# Patient Record
Sex: FEMALE | Race: BLACK OR AFRICAN AMERICAN
[De-identification: names, ages, dates, MRNs, and addresses within clinical notes are randomized per-mention and may not be internally consistent; named-entity substitution may affect disease eponyms.]

---

## 2019-06-22 ENCOUNTER — HOSPITAL ENCOUNTER (EMERGENCY)
Dept: HOSPITAL 35 - ER | Age: 44
Discharge: HOME | End: 2019-06-22
Payer: COMMERCIAL

## 2019-06-22 VITALS — WEIGHT: 255.01 LBS | HEIGHT: 62 IN | BODY MASS INDEX: 46.93 KG/M2

## 2019-06-22 VITALS — SYSTOLIC BLOOD PRESSURE: 148 MMHG | DIASTOLIC BLOOD PRESSURE: 87 MMHG

## 2019-06-22 DIAGNOSIS — E78.00: ICD-10-CM

## 2019-06-22 DIAGNOSIS — E11.9: ICD-10-CM

## 2019-06-22 DIAGNOSIS — L03.221: Primary | ICD-10-CM

## 2019-06-22 LAB
%HYPO/RBC NFR BLD AUTO: (no result) %
ANION GAP SERPL CALC-SCNC: 11 MMOL/L (ref 7–16)
ANISOCYTOSIS BLD QL SMEAR: (no result)
BASOPHILS NFR BLD AUTO: 0.5 % (ref 0–2)
BUN SERPL-MCNC: 5 MG/DL (ref 7–18)
CALCIUM SERPL-MCNC: 9.2 MG/DL (ref 8.5–10.1)
CHLORIDE SERPL-SCNC: 98 MMOL/L (ref 98–107)
CO2 SERPL-SCNC: 26 MMOL/L (ref 21–32)
CREAT SERPL-MCNC: 0.8 MG/DL (ref 0.6–1)
EOSINOPHIL NFR BLD: 0.4 % (ref 0–3)
ERYTHROCYTE [DISTWIDTH] IN BLOOD BY AUTOMATED COUNT: 18.2 % (ref 10.5–14.5)
GLUCOSE SERPL-MCNC: 277 MG/DL (ref 74–106)
GRANULOCYTES NFR BLD MANUAL: 85.4 % (ref 36–66)
HCT VFR BLD CALC: 38.1 % (ref 37–47)
HGB BLD-MCNC: 11.7 GM/DL (ref 12–15)
LYMPHOCYTES NFR BLD AUTO: 8.6 % (ref 24–44)
MCH RBC QN AUTO: 22.6 PG (ref 26–34)
MCHC RBC AUTO-ENTMCNC: 30.8 G/DL (ref 28–37)
MCV RBC: 73.4 FL (ref 80–100)
MICROCYTES: (no result)
MONOCYTES NFR BLD: 5.1 % (ref 1–8)
NEUTROPHILS # BLD: 14.3 THOU/UL (ref 1.4–8.2)
PLATELET # BLD EST: NORMAL 10*3/UL
PLATELET # BLD: 344 THOU/UL (ref 150–400)
POTASSIUM SERPL-SCNC: 3.9 MMOL/L (ref 3.5–5.1)
RBC # BLD AUTO: 5.2 MIL/UL (ref 4.2–5)
SODIUM SERPL-SCNC: 135 MMOL/L (ref 136–145)
WBC # BLD AUTO: 16.7 THOU/UL (ref 4–11)

## 2019-06-26 ENCOUNTER — HOSPITAL ENCOUNTER (INPATIENT)
Dept: HOSPITAL 35 - ER | Age: 44
LOS: 12 days | Discharge: HOME HEALTH | DRG: 853 | End: 2019-07-08
Payer: COMMERCIAL

## 2019-06-26 VITALS — DIASTOLIC BLOOD PRESSURE: 83 MMHG | SYSTOLIC BLOOD PRESSURE: 138 MMHG

## 2019-06-26 VITALS — DIASTOLIC BLOOD PRESSURE: 73 MMHG | SYSTOLIC BLOOD PRESSURE: 127 MMHG

## 2019-06-26 VITALS — SYSTOLIC BLOOD PRESSURE: 153 MMHG | DIASTOLIC BLOOD PRESSURE: 82 MMHG

## 2019-06-26 VITALS — SYSTOLIC BLOOD PRESSURE: 135 MMHG | DIASTOLIC BLOOD PRESSURE: 85 MMHG

## 2019-06-26 VITALS — DIASTOLIC BLOOD PRESSURE: 107 MMHG | SYSTOLIC BLOOD PRESSURE: 185 MMHG

## 2019-06-26 VITALS — SYSTOLIC BLOOD PRESSURE: 144 MMHG | DIASTOLIC BLOOD PRESSURE: 78 MMHG

## 2019-06-26 VITALS — HEIGHT: 62.01 IN | WEIGHT: 256 LBS | BODY MASS INDEX: 46.51 KG/M2

## 2019-06-26 VITALS — DIASTOLIC BLOOD PRESSURE: 80 MMHG | SYSTOLIC BLOOD PRESSURE: 126 MMHG

## 2019-06-26 VITALS — SYSTOLIC BLOOD PRESSURE: 145 MMHG | DIASTOLIC BLOOD PRESSURE: 90 MMHG

## 2019-06-26 VITALS — SYSTOLIC BLOOD PRESSURE: 169 MMHG | DIASTOLIC BLOOD PRESSURE: 97 MMHG

## 2019-06-26 VITALS — DIASTOLIC BLOOD PRESSURE: 71 MMHG | SYSTOLIC BLOOD PRESSURE: 133 MMHG

## 2019-06-26 VITALS — SYSTOLIC BLOOD PRESSURE: 148 MMHG | DIASTOLIC BLOOD PRESSURE: 84 MMHG

## 2019-06-26 VITALS — SYSTOLIC BLOOD PRESSURE: 127 MMHG | DIASTOLIC BLOOD PRESSURE: 88 MMHG

## 2019-06-26 DIAGNOSIS — M72.9: ICD-10-CM

## 2019-06-26 DIAGNOSIS — E78.00: ICD-10-CM

## 2019-06-26 DIAGNOSIS — J85.2: ICD-10-CM

## 2019-06-26 DIAGNOSIS — L02.11: ICD-10-CM

## 2019-06-26 DIAGNOSIS — L03.221: ICD-10-CM

## 2019-06-26 DIAGNOSIS — E11.65: ICD-10-CM

## 2019-06-26 DIAGNOSIS — K05.6: ICD-10-CM

## 2019-06-26 DIAGNOSIS — Z91.14: ICD-10-CM

## 2019-06-26 DIAGNOSIS — E78.5: ICD-10-CM

## 2019-06-26 DIAGNOSIS — R91.1: ICD-10-CM

## 2019-06-26 DIAGNOSIS — E66.01: ICD-10-CM

## 2019-06-26 DIAGNOSIS — A41.81: Primary | ICD-10-CM

## 2019-06-26 DIAGNOSIS — Z79.84: ICD-10-CM

## 2019-06-26 DIAGNOSIS — L02.419: ICD-10-CM

## 2019-06-26 DIAGNOSIS — Z79.899: ICD-10-CM

## 2019-06-26 LAB
%HYPO/RBC NFR BLD AUTO: (no result) %
ANION GAP SERPL CALC-SCNC: 13 MMOL/L (ref 7–16)
ANISOCYTOSIS BLD QL SMEAR: (no result)
BASO STIPL BLD QL SMEAR: (no result)
BE(VIVO): -3.2 MMOL/L
BUN SERPL-MCNC: 5 MG/DL (ref 7–18)
CALCIUM SERPL-MCNC: 10.1 MG/DL (ref 8.5–10.1)
CHLORIDE SERPL-SCNC: 94 MMOL/L (ref 98–107)
CHOLEST SERPL-MCNC: 193 MG/DL (ref ?–200)
CO2 SERPL-SCNC: 26 MMOL/L (ref 21–32)
CREAT SERPL-MCNC: 0.8 MG/DL (ref 0.6–1)
ERYTHROCYTE [DISTWIDTH] IN BLOOD BY AUTOMATED COUNT: 18.1 % (ref 10.5–14.5)
EST. AVERAGE GLUCOSE BLD GHB EST-MCNC: 289 MG/DL
GLUCOSE SERPL-MCNC: 331 MG/DL (ref 74–106)
GLYCOHEMOGLOBIN (HGB A1C): 11.7 % (ref 4.8–5.6)
GRANULOCYTES NFR BLD MANUAL: 65 % (ref 36–66)
HCO3 BLD-SCNC: 22.9 MMOL/L (ref 22–26)
HCT VFR BLD CALC: 37.4 % (ref 37–47)
HDLC SERPL-MCNC: 22 MG/DL (ref 40–?)
HGB BLD-MCNC: 11.6 GM/DL (ref 12–15)
LDLC SERPL-MCNC: 145 MG/DL (ref ?–100)
LG PLATELETS BLD QL SMEAR: (no result)
LYMPHOCYTES NFR BLD AUTO: 18 % (ref 24–44)
MCH RBC QN AUTO: 22.5 PG (ref 26–34)
MCHC RBC AUTO-ENTMCNC: 30.9 G/DL (ref 28–37)
MCV RBC: 73 FL (ref 80–100)
MICROCYTES: (no result)
MONOCYTES NFR BLD: 9 % (ref 1–8)
NEUTROPHILS # BLD: 16.6 THOU/UL (ref 1.4–8.2)
NEUTS BAND NFR BLD: 8 % (ref 0–8)
PCO2 BLD: 45.4 MMHG (ref 35–45)
PLATELET # BLD EST: NORMAL 10*3/UL
PLATELET # BLD: 368 THOU/UL (ref 150–400)
PO2 BLD: 95.5 MMHG (ref 80–100)
POIKILOCYTOSIS BLD QL SMEAR: (no result)
POLYCHROMASIA BLD QL SMEAR: (no result)
POTASSIUM SERPL-SCNC: 4 MMOL/L (ref 3.5–5.1)
RBC # BLD AUTO: 5.13 MIL/UL (ref 4.2–5)
SODIUM SERPL-SCNC: 133 MMOL/L (ref 136–145)
TC:HDL: 8.8 RATIO
TRIGL SERPL-MCNC: 139 MG/DL (ref ?–150)
VLDLC SERPL CALC-MCNC: 28 MG/DL (ref ?–40)
WBC # BLD AUTO: 22.7 THOU/UL (ref 4–11)

## 2019-06-26 PROCEDURE — 10879: CPT

## 2019-06-26 PROCEDURE — 50010 RENAL EXPLORATION: CPT

## 2019-06-26 PROCEDURE — 10047: CPT

## 2019-06-26 PROCEDURE — 27000 TENOTOMY ADDUCTOR HIP PERQ: CPT

## 2019-06-26 PROCEDURE — 62110: CPT

## 2019-06-26 PROCEDURE — 62900: CPT

## 2019-06-26 PROCEDURE — 70005: CPT

## 2019-06-26 PROCEDURE — 50386 REMOVE STENT VIA TRANSURETH: CPT

## 2019-06-26 PROCEDURE — 50417: CPT

## 2019-06-26 PROCEDURE — 50398: CPT

## 2019-06-26 PROCEDURE — 50101: CPT

## 2019-06-26 NOTE — NUR
ASSUMED PATIENT CARE AT 0700. A/O X4. NECK HAS EDMEA WITH OPEN ULCER AND
DRAINAGE. VSS WITH TEMP. SATRT 02 3L/NC AT THIS TIME. PATIENT AMBULATED IN
ROOM. NPO UNTILL NOW. PATIENT WENT TO OR TO HAVE NECK ABCESS I &D. WILL KEEP
MONITOR.

## 2019-06-26 NOTE — NUR
PATIENT WAS A NEW ADMISSION TO THE UNIT THIS SHIFT. SHE ARRIVED VIA CART FROM
THE ER AND WAS ABLE TO AMBULATE TO BED WITHOUIT INCIDENT. PATIENT IS FULLY
ORIENTED AND ABLE TO PARTICIPATE IN ADMISSION AND CALL APPROPRIATELY FOR
NEEDS. NURSE TO COMPLETE ADMISSION PROCESS AND INITIATE CARE PLAN.

## 2019-06-26 NOTE — NUR
WOUND CARE CONSULT;
THIS PATIENT HAS THE S/S CONSISTANT WITH AN ABCESS OF THE POSERIOR NECK.
INDURATION NOTED TO THE NECK AND UPPER BACK DRAINING PURULENT BROWNISH RED
THICK DRAINAGE.  THE PATIENT IS IN PAIN RECIEVING MORPHINE IV AT THIS TIME.
SHE IS VERY UNCOMFORTABLE. A BOARDERED FOAM DRESSING WAS IN PLACE AND WAS
OVERTAKEN BY THE DRAINAGE.
 
RECOMMENDATION; AQUACEL AG TO WOUND BED, COVER WITH A BOARDERED FOAM DRESSING,
CHANGE DAILY/PRN
 
DISCUSSED WITH RN

## 2019-06-27 VITALS — SYSTOLIC BLOOD PRESSURE: 118 MMHG | DIASTOLIC BLOOD PRESSURE: 65 MMHG

## 2019-06-27 VITALS — DIASTOLIC BLOOD PRESSURE: 78 MMHG | SYSTOLIC BLOOD PRESSURE: 138 MMHG

## 2019-06-27 VITALS — DIASTOLIC BLOOD PRESSURE: 77 MMHG | SYSTOLIC BLOOD PRESSURE: 128 MMHG

## 2019-06-27 VITALS — SYSTOLIC BLOOD PRESSURE: 127 MMHG | DIASTOLIC BLOOD PRESSURE: 68 MMHG

## 2019-06-27 VITALS — SYSTOLIC BLOOD PRESSURE: 130 MMHG | DIASTOLIC BLOOD PRESSURE: 81 MMHG

## 2019-06-27 VITALS — SYSTOLIC BLOOD PRESSURE: 145 MMHG | DIASTOLIC BLOOD PRESSURE: 90 MMHG

## 2019-06-27 VITALS — DIASTOLIC BLOOD PRESSURE: 88 MMHG | SYSTOLIC BLOOD PRESSURE: 150 MMHG

## 2019-06-27 VITALS — SYSTOLIC BLOOD PRESSURE: 135 MMHG | DIASTOLIC BLOOD PRESSURE: 84 MMHG

## 2019-06-27 VITALS — SYSTOLIC BLOOD PRESSURE: 142 MMHG | DIASTOLIC BLOOD PRESSURE: 59 MMHG

## 2019-06-27 VITALS — SYSTOLIC BLOOD PRESSURE: 137 MMHG | DIASTOLIC BLOOD PRESSURE: 75 MMHG

## 2019-06-27 LAB
ANION GAP SERPL CALC-SCNC: 12 MMOL/L (ref 7–16)
BUN SERPL-MCNC: 6 MG/DL (ref 7–18)
CALCIUM SERPL-MCNC: 7.9 MG/DL (ref 8.5–10.1)
CHLORIDE SERPL-SCNC: 101 MMOL/L (ref 98–107)
CO2 SERPL-SCNC: 24 MMOL/L (ref 21–32)
CREAT SERPL-MCNC: 0.6 MG/DL (ref 0.6–1)
ERYTHROCYTE [DISTWIDTH] IN BLOOD BY AUTOMATED COUNT: 18.4 % (ref 10.5–14.5)
GLUCOSE SERPL-MCNC: 159 MG/DL (ref 74–106)
HCT VFR BLD CALC: 30.4 % (ref 37–47)
HGB BLD-MCNC: 9.3 GM/DL (ref 12–15)
MCH RBC QN AUTO: 22.7 PG (ref 26–34)
MCHC RBC AUTO-ENTMCNC: 30.6 G/DL (ref 28–37)
MCV RBC: 73.9 FL (ref 80–100)
PLATELET # BLD: 292 THOU/UL (ref 150–400)
POTASSIUM SERPL-SCNC: 4.3 MMOL/L (ref 3.5–5.1)
RBC # BLD AUTO: 4.11 MIL/UL (ref 4.2–5)
SODIUM SERPL-SCNC: 137 MMOL/L (ref 136–145)
WBC # BLD AUTO: 27 THOU/UL (ref 4–11)

## 2019-06-27 NOTE — NUR
PATIENT IS PROGRESSING SLOWLY IN HER CARE PLAN. VITAL SIGNS STABLE WITH
PATIENT HAVING NO COMPLAINTS OF NAUSEA. PATIENT COMPLAINED OF PAIN IN BACK OF
NECK FROM SURGICAL SITE AND WAS TREATED EFFECTIVELY WITH MEDICATION AND
REPOSITIONING. PATIENT BROUGHT UP FROM PACU AROUND 2100 FOLLOWING I&D OF NECK
WOUND. VITAL SIGNS TAKEN PER PROTOCOL. PATIENT EXHIBITED FEVER  AT
HIGHEST BUT HAS SINCE SHOWN REDUCTION WITH TYLENOL AND NON PHARMACOLOGICAL
INTERVENTION. BREATHING STABLE ON OXYGEN AS EVIDENCED BY READINGS ON
CONTINUOUS SATURATION MONITOR. SURGICAL SITE HAS SHOWN SOME DRAINAGE AND HAS
BEEN REINFORCED. PATIENT HAS BEEN UP TO THE BEDSIDE COMMODE WITH ASSISTANCE
INCIDENT FREE, BUT SHOULD STILL BE CONSIDERED A FALL RISK. FULLY ORIENTED AND
ABLE TO PARTICIPATE IN CARE. CONTINUE PLAN OF CARE.

## 2019-06-27 NOTE — NUR
PT RATES PAIN TO NECK SURGICAL SITE BETWEEN 7 AND 10 OUT OF 10..MEDICATED PER
EMAR...VERY FLAT AFFECT..DROWSY...

## 2019-06-28 VITALS — DIASTOLIC BLOOD PRESSURE: 92 MMHG | SYSTOLIC BLOOD PRESSURE: 140 MMHG

## 2019-06-28 VITALS — DIASTOLIC BLOOD PRESSURE: 68 MMHG | SYSTOLIC BLOOD PRESSURE: 117 MMHG

## 2019-06-28 VITALS — DIASTOLIC BLOOD PRESSURE: 82 MMHG | SYSTOLIC BLOOD PRESSURE: 125 MMHG

## 2019-06-28 VITALS — DIASTOLIC BLOOD PRESSURE: 89 MMHG | SYSTOLIC BLOOD PRESSURE: 132 MMHG

## 2019-06-28 VITALS — DIASTOLIC BLOOD PRESSURE: 102 MMHG | SYSTOLIC BLOOD PRESSURE: 155 MMHG

## 2019-06-28 LAB
ANION GAP SERPL CALC-SCNC: 9 MMOL/L (ref 7–16)
BUN SERPL-MCNC: 4 MG/DL (ref 7–18)
CALCIUM SERPL-MCNC: 8.6 MG/DL (ref 8.5–10.1)
CHLORIDE SERPL-SCNC: 101 MMOL/L (ref 98–107)
CO2 SERPL-SCNC: 29 MMOL/L (ref 21–32)
CREAT SERPL-MCNC: 0.8 MG/DL (ref 0.6–1)
ERYTHROCYTE [DISTWIDTH] IN BLOOD BY AUTOMATED COUNT: 18.5 % (ref 10.5–14.5)
GLUCOSE SERPL-MCNC: 193 MG/DL (ref 74–106)
HCT VFR BLD CALC: 28.9 % (ref 37–47)
HGB BLD-MCNC: 8.8 GM/DL (ref 12–15)
MCH RBC QN AUTO: 22.2 PG (ref 26–34)
MCHC RBC AUTO-ENTMCNC: 30.6 G/DL (ref 28–37)
MCV RBC: 72.7 FL (ref 80–100)
PLATELET # BLD: 318 THOU/UL (ref 150–400)
POTASSIUM SERPL-SCNC: 3.3 MMOL/L (ref 3.5–5.1)
RBC # BLD AUTO: 3.98 MIL/UL (ref 4.2–5)
SODIUM SERPL-SCNC: 139 MMOL/L (ref 136–145)
WBC # BLD AUTO: 14.8 THOU/UL (ref 4–11)

## 2019-06-28 NOTE — NUR
PT UP IN CHAIR, AWAKE. HAS HAD RESTLESS NIGHT. C/O NECK BEING UNCOMFORTABLE.
NECK REMAINS SWOLLEN, INFLAMED, PINK/RED AND WITH DRAINAGE. PT SR ON MONITOR.
CONTINUES ON 2L NC AND ON MAINTENANCE IVFs. AM LABS TO BE DRAWN AND REVIEWED.

## 2019-06-28 NOTE — NUR
INITIAL ASSESSMENT:
SW reviewed chart and spoke with nursing and attending physician. Pt was
admitted from home due to sepsis/neck abscess. Pt is s/p I&D of abscess. Pt
remains on IV abx. SW met with pt at bedside. Introduced role of SW. Pt is
alert/orientated x 4. Pt reports she lives at home with her . Prior to
admission, pt was independent with ADLs. No use of DME. Pt is currently on O2.
Pt states she does not have a PCP. Pt has a list of [facility] providers. Unsure of
discharge timeframe at this time. Plan is for pt to discharge home when
medically stable. SW is following to assist as needed with discharge planning.

## 2019-06-28 NOTE — NUR
AAOX4 PLESANT WITH FLAT AFFECT.  USES CALL LIGHT APPROPIRATELY.  MORPHNE FOR
PAIN.  SPOUSE AT BEDSIDE.  GOOD APPETITE.  DRESSING TO POSTERIOR NECK DRY AND
INTACT.  UP TO BR WITH SLOW STEADY GAIT.

## 2019-06-28 NOTE — NUR
POSTERIOR NECK DRESSING CHANGED - PURULENT DRAINAGE.  WOUND REPACKED WITH
AQUAGEL COVERED WITH KERLIX AND ABD.

## 2019-06-29 VITALS — DIASTOLIC BLOOD PRESSURE: 65 MMHG | SYSTOLIC BLOOD PRESSURE: 104 MMHG

## 2019-06-29 VITALS — DIASTOLIC BLOOD PRESSURE: 85 MMHG | SYSTOLIC BLOOD PRESSURE: 137 MMHG

## 2019-06-29 VITALS — DIASTOLIC BLOOD PRESSURE: 86 MMHG | SYSTOLIC BLOOD PRESSURE: 138 MMHG

## 2019-06-29 VITALS — DIASTOLIC BLOOD PRESSURE: 78 MMHG | SYSTOLIC BLOOD PRESSURE: 141 MMHG

## 2019-06-29 LAB
ANION GAP SERPL CALC-SCNC: 8 MMOL/L (ref 7–16)
BUN SERPL-MCNC: 5 MG/DL (ref 7–18)
CALCIUM SERPL-MCNC: 8.1 MG/DL (ref 8.5–10.1)
CHLORIDE SERPL-SCNC: 101 MMOL/L (ref 98–107)
CO2 SERPL-SCNC: 29 MMOL/L (ref 21–32)
CREAT SERPL-MCNC: 0.7 MG/DL (ref 0.6–1)
ERYTHROCYTE [DISTWIDTH] IN BLOOD BY AUTOMATED COUNT: 18.3 % (ref 10.5–14.5)
GLUCOSE SERPL-MCNC: 194 MG/DL (ref 74–106)
HCT VFR BLD CALC: 28.3 % (ref 37–47)
HGB BLD-MCNC: 8.7 GM/DL (ref 12–15)
MCH RBC QN AUTO: 22.6 PG (ref 26–34)
MCHC RBC AUTO-ENTMCNC: 30.9 G/DL (ref 28–37)
MCV RBC: 73.1 FL (ref 80–100)
PLATELET # BLD: 312 THOU/UL (ref 150–400)
POTASSIUM SERPL-SCNC: 3.3 MMOL/L (ref 3.5–5.1)
RBC # BLD AUTO: 3.87 MIL/UL (ref 4.2–5)
SODIUM SERPL-SCNC: 138 MMOL/L (ref 136–145)
WBC # BLD AUTO: 9.7 THOU/UL (ref 4–11)

## 2019-06-29 NOTE — NUR
AAOX4 VERY PLEASANT AND COOPERATVE.  MSO4 AND HYDROCODONE FOR PAIN IN
POSTERIOR NECK WOUND.  DRESSING CHANGED X2 AND PACKED PURULENT WOUND WITH
AQUAGEL COVERED WITH KERLIX AND ABD.  RIGHT UPPER ARM IV SALINE LOCKED.  FAIR
APPETITE TOLERATING FOOD AND FLUIDS WITHOUT C/O N/V.  FAMILY AT BEDSIDED.
SHOWER TAKEN THIS AM WITH GOOD TOLERATION OF ACTIVITY. ENCOURAGE PATIENT TO
AMBULATE.

## 2019-06-29 NOTE — NUR
ASSUMED CARE OF PT AT 1900. A&Ox4, COOPERATIVE. VS STABLE, SR ON TELE. C/O
PAIN IN POSTERIOR NECK, WITH ERYTHEMA AND OPEN WOUND. PAIN MEDS GIVEN X 2 WITH
PARTIAL RELIEF AND DRESSING CHANGED W/ SMALL AMOUNT OF SEROUSANGIOUS DRAINAGE.
IV FLUIDS AND ANTIBX'S INFUSING AS ORDERED. WALKED HALLWAYS W/ SBA. NO ACUTE
DISTRESS. DAUGHTER AT BEDSIDE. PROGRESSING TOWARDS POC GOALS.

## 2019-06-30 VITALS — SYSTOLIC BLOOD PRESSURE: 149 MMHG | DIASTOLIC BLOOD PRESSURE: 95 MMHG

## 2019-06-30 VITALS — SYSTOLIC BLOOD PRESSURE: 147 MMHG | DIASTOLIC BLOOD PRESSURE: 80 MMHG

## 2019-06-30 VITALS — DIASTOLIC BLOOD PRESSURE: 97 MMHG | SYSTOLIC BLOOD PRESSURE: 135 MMHG

## 2019-06-30 VITALS — SYSTOLIC BLOOD PRESSURE: 145 MMHG | DIASTOLIC BLOOD PRESSURE: 84 MMHG

## 2019-06-30 NOTE — NUR
ASSUMED CARE AT START OF SHIFT PT C/O PAIN AT NECK WOUND SITE, DRESSING
CHANGED AT 2300 LARGE AMOUNT OF PUS LIKE DRAINAGE NOTED, PAIN MEDICATION GIVEN
AS PRESCRIBED , TOLERATED DRESSING . DISCUSSED PLAN OF CARE AND AGREEABLE ,
CARDIAC MONIOTR SHOWS NSR , WILL CONINTUE WITH POC AND EPORT CHANGES OR
ABNORMAL FINDINGS.

## 2019-06-30 NOTE — NUR
ASSUMED CARE OF PT AT APPROX 0700. PT IS ALERT AND ORIENTED X4, MONITOREDON
TELE AND ABLE TO MAINTAIN 02 SAT >90 ON RA. DENIES SOA, EVEN NON LABORED
BREATHING. COMPLAINS OF PAIN IN NECK AT SITE OF I AND D THAT IS CONTROLLED
WITH PRN PAIN MEDICATIONS. ASSESSMENT AS CHARTED. WOUND CARE PROVIDED X2 TODAY
AS DRESSING BECAME SATURATED AND WAS LEAKING. PT TOLERATED WELL. PT UPDATED ON
POC AND DENIES ANY NEW CONCERNS OR QUESTIONS. WILL CONTINUE TO MONITOR.

## 2019-07-01 VITALS — DIASTOLIC BLOOD PRESSURE: 101 MMHG | SYSTOLIC BLOOD PRESSURE: 168 MMHG

## 2019-07-01 VITALS — SYSTOLIC BLOOD PRESSURE: 137 MMHG | DIASTOLIC BLOOD PRESSURE: 81 MMHG

## 2019-07-01 VITALS — DIASTOLIC BLOOD PRESSURE: 82 MMHG | SYSTOLIC BLOOD PRESSURE: 151 MMHG

## 2019-07-01 VITALS — SYSTOLIC BLOOD PRESSURE: 151 MMHG | DIASTOLIC BLOOD PRESSURE: 99 MMHG

## 2019-07-01 VITALS — SYSTOLIC BLOOD PRESSURE: 151 MMHG | DIASTOLIC BLOOD PRESSURE: 92 MMHG

## 2019-07-01 VITALS — SYSTOLIC BLOOD PRESSURE: 143 MMHG | DIASTOLIC BLOOD PRESSURE: 84 MMHG

## 2019-07-01 LAB
ANION GAP SERPL CALC-SCNC: 5 MMOL/L (ref 7–16)
BUN SERPL-MCNC: 3 MG/DL (ref 7–18)
CALCIUM SERPL-MCNC: 8.8 MG/DL (ref 8.5–10.1)
CHLORIDE SERPL-SCNC: 102 MMOL/L (ref 98–107)
CO2 SERPL-SCNC: 33 MMOL/L (ref 21–32)
CREAT SERPL-MCNC: 0.7 MG/DL (ref 0.6–1)
ERYTHROCYTE [DISTWIDTH] IN BLOOD BY AUTOMATED COUNT: 18.3 % (ref 10.5–14.5)
GLUCOSE SERPL-MCNC: 181 MG/DL (ref 74–106)
HCT VFR BLD CALC: 29.4 % (ref 37–47)
HGB BLD-MCNC: 8.9 GM/DL (ref 12–15)
MAGNESIUM SERPL-MCNC: 2.2 MG/DL (ref 1.8–2.4)
MCH RBC QN AUTO: 22.2 PG (ref 26–34)
MCHC RBC AUTO-ENTMCNC: 30.3 G/DL (ref 28–37)
MCV RBC: 73.2 FL (ref 80–100)
PLATELET # BLD: 328 THOU/UL (ref 150–400)
POTASSIUM SERPL-SCNC: 3.5 MMOL/L (ref 3.5–5.1)
RBC # BLD AUTO: 4.01 MIL/UL (ref 4.2–5)
SODIUM SERPL-SCNC: 140 MMOL/L (ref 136–145)
WBC # BLD AUTO: 6.2 THOU/UL (ref 4–11)

## 2019-07-01 NOTE — NUR
ASSUMED PT CARE AROUND 1900. A&OX4. C/O POSTERIOR NECK PAIN AT ABSCESS SITE.
HYDROCODONE GIVE AS INDICATED FOR PAIN. PT STATED PAIN IMPROVED WITH
MEDICATION. PT SLEPT PART OF THE NIGHT. RESP EVEN AND UNLABORED. VSS. UP AD
BLADIMIR AROUND THE ROOM WITH STEADY GAIT. NECK WOUND DRESSING CHANGED TWICE SO FAR
THIS SHIFT DUE TO EXCESS DRAINAGE. PROGRESSING SLOWLY TOWARD POC GOALS. WILL
CONTINUE TO MONITOR FURTHER.

## 2019-07-01 NOTE — NUR
PT RECEIVED FROM 3W THIS AM AT 0820. PT ALERT AND IN NO ACUTE DISTRESS.
POSTERIOR NECK INFECTION. I&D ON 6/26 BUT STILL DRAINING PURULENT DRAINAGE.
DR. ACEVEDO IN AND STATES PT NEEDED ADDITIONAL I&D FOR INCREASED TISSUE
SWEELING. ULTRASOUND OF AREA TO BE COMPLETED. ANTIBIOTICS CHANGED.
INCISION AREA FLUSHED W/ NS AND REDRESSED PER DR. OTTO.

## 2019-07-01 NOTE — NUR
PT SEEN BY WOUND CARE NURSE THIS AFTERNOON AND DSNG CHANGED. PURULENT DRAINAGE
OUT. NORCO TAKING CARE OF PAIN. ACCUCK BETTER. PT UP IN THE ROOM.

## 2019-07-01 NOTE — NUR
REPORT CALLED TO [facility] 4TH FLOOR. TELE DISOCONTINUED AND PT TRANSFERRED TO
ROOM [id number]. 17-Sep-2018 19:31

## 2019-07-02 VITALS — SYSTOLIC BLOOD PRESSURE: 155 MMHG | DIASTOLIC BLOOD PRESSURE: 91 MMHG

## 2019-07-02 VITALS — DIASTOLIC BLOOD PRESSURE: 80 MMHG | SYSTOLIC BLOOD PRESSURE: 145 MMHG

## 2019-07-02 VITALS — SYSTOLIC BLOOD PRESSURE: 166 MMHG | DIASTOLIC BLOOD PRESSURE: 103 MMHG

## 2019-07-02 VITALS — DIASTOLIC BLOOD PRESSURE: 91 MMHG | SYSTOLIC BLOOD PRESSURE: 163 MMHG

## 2019-07-02 VITALS — DIASTOLIC BLOOD PRESSURE: 86 MMHG | SYSTOLIC BLOOD PRESSURE: 122 MMHG

## 2019-07-02 LAB
ANION GAP SERPL CALC-SCNC: 7 MMOL/L (ref 7–16)
BUN SERPL-MCNC: 3 MG/DL (ref 7–18)
CALCIUM SERPL-MCNC: 8.4 MG/DL (ref 8.5–10.1)
CHLORIDE SERPL-SCNC: 104 MMOL/L (ref 98–107)
CO2 SERPL-SCNC: 29 MMOL/L (ref 21–32)
CREAT SERPL-MCNC: 0.6 MG/DL (ref 0.6–1)
ERYTHROCYTE [DISTWIDTH] IN BLOOD BY AUTOMATED COUNT: 18.5 % (ref 10.5–14.5)
GLUCOSE SERPL-MCNC: 148 MG/DL (ref 74–106)
HCT VFR BLD CALC: 28.9 % (ref 37–47)
HGB BLD-MCNC: 8.9 GM/DL (ref 12–15)
MAGNESIUM SERPL-MCNC: 2 MG/DL (ref 1.8–2.4)
MCH RBC QN AUTO: 22.7 PG (ref 26–34)
MCHC RBC AUTO-ENTMCNC: 30.7 G/DL (ref 28–37)
MCV RBC: 74 FL (ref 80–100)
PLATELET # BLD: 312 THOU/UL (ref 150–400)
POTASSIUM SERPL-SCNC: 4.1 MMOL/L (ref 3.5–5.1)
RBC # BLD AUTO: 3.9 MIL/UL (ref 4.2–5)
SODIUM SERPL-SCNC: 140 MMOL/L (ref 136–145)
WBC # BLD AUTO: 5.9 THOU/UL (ref 4–11)

## 2019-07-02 NOTE — NUR
ASSUMED CARE OF PT AT APPROX 0700. PT IS ALERT AND ORIENTED X4 AND ABLE TO
MAINTAIN 02 SAT >90 ON RA. ASSESSMENT AS CHARTED. PT COMPLAINS OF NECK PAIN
RELATED TO WOUND THAT IS CONTROLLED WITH PRN PAIN MEDICATIONS. DRESSING CHANGE
PROVIDED X3 TODAY. PT WILL GO FOR MORE SURGERY TO WOUND IN THE AM. TO BE NPO
AFTER MIDNIGHT. PT HAS BEEN UPDATED ON POC. DENIES FURTHER QUESTIONS OR
CONCERNS. WILL CONTINUE TO MONITOR.

## 2019-07-02 NOTE — NUR
DP FAXED FACESHEET TO [facility] [phone] & [facility] [phone] ASKING
IF THEY WOULD CHECK HOME INFUSION BENEFITS IN CASE PATIENT NEES IV ANTIBIOTICS
AT [place].

## 2019-07-02 NOTE — NUR
Patient assessed and is alert x 4. Skin warm and dry. Resp even and unlabored.
SITTING UP IN CHAIR HAS DRESSING DRY AND IS LEAKING SOME, REINFORCED AND IS
DRAINING GREESNISH/PEREZ DISCHARGE. LUNGS CTA. TAKING DIET WELL.  RIGHT UPPER
ARM IV GETTING SORE, HAD NEW #22 STARTED IN LEFT UPPER ARM/CHEST THAT WORKIS
WELL. IV ANTIBIOTICS CONTINUE TO INFUSE. PAIN MED GIVEN WITH GOOD RELIEF.
NECK WOUND REINFORCED AGAIN THIS AM. NO OTHER WOUNDS NOTED. CONT PLAN OF CARE.
FAMILY AT BEDSIDE. ON ROOM AIR.

## 2019-07-02 NOTE — NUR
Nutrition: No new wt.  Nsg noted pt taking po well, no intake records.  BG
118-181, BUN 3.  Meds reviewed.  Physician noted yesterday pt improved without
complaints but some neck pain and may need drainage and debridement on Wed.
Rec offer snacks/supplement PRN.  Continues at low nutrition risk.

## 2019-07-03 VITALS — DIASTOLIC BLOOD PRESSURE: 91 MMHG | SYSTOLIC BLOOD PRESSURE: 146 MMHG

## 2019-07-03 VITALS — DIASTOLIC BLOOD PRESSURE: 88 MMHG | SYSTOLIC BLOOD PRESSURE: 150 MMHG

## 2019-07-03 VITALS — SYSTOLIC BLOOD PRESSURE: 142 MMHG | DIASTOLIC BLOOD PRESSURE: 64 MMHG

## 2019-07-03 VITALS — SYSTOLIC BLOOD PRESSURE: 147 MMHG | DIASTOLIC BLOOD PRESSURE: 81 MMHG

## 2019-07-03 LAB
ANION GAP SERPL CALC-SCNC: 9 MMOL/L (ref 7–16)
BUN SERPL-MCNC: 9 MG/DL (ref 7–18)
CALCIUM SERPL-MCNC: 8.4 MG/DL (ref 8.5–10.1)
CHLORIDE SERPL-SCNC: 103 MMOL/L (ref 98–107)
CO2 SERPL-SCNC: 27 MMOL/L (ref 21–32)
CREAT SERPL-MCNC: 0.8 MG/DL (ref 0.6–1)
ERYTHROCYTE [DISTWIDTH] IN BLOOD BY AUTOMATED COUNT: 17.9 % (ref 10.5–14.5)
GLUCOSE SERPL-MCNC: 187 MG/DL (ref 74–106)
HCT VFR BLD CALC: 30.8 % (ref 37–47)
HGB BLD-MCNC: 9.4 GM/DL (ref 12–15)
MAGNESIUM SERPL-MCNC: 1.8 MG/DL (ref 1.8–2.4)
MCH RBC QN AUTO: 22.6 PG (ref 26–34)
MCHC RBC AUTO-ENTMCNC: 30.4 G/DL (ref 28–37)
MCV RBC: 74.2 FL (ref 80–100)
PLATELET # BLD: 313 THOU/UL (ref 150–400)
POTASSIUM SERPL-SCNC: 3.9 MMOL/L (ref 3.5–5.1)
RBC # BLD AUTO: 4.15 MIL/UL (ref 4.2–5)
SODIUM SERPL-SCNC: 139 MMOL/L (ref 136–145)
WBC # BLD AUTO: 6.3 THOU/UL (ref 4–11)

## 2019-07-03 NOTE — NUR
SHOULD PT NEED HOME INFUSION FOR IV ABX UPON DISHCARGE REFERRAL HAD BEEN SENT
TO [facility]. ORDERS AND IV ABX REC WILL NEED TO BE FAXED TO THEM AT
[phone]. CALL THEM AT [phone] OR THE PHARMASIST DIRECTLY AT
[phone]. [facility] ORDERS WILL NEED TO
BE FAXED TO [phone] CALL THEM AT [phone].

## 2019-07-03 NOTE — NUR
RECEIVED REPORT FROM NIGHT NURSE AT 0700, PT IS OFF THE UNIT AT THIS MOMENT
FOR I& D. NO INSULIN GIVEN AT THIS MOMENT. WILL CONTINUE TO MONITOR.

## 2019-07-03 NOTE — NUR
Assumed care at 1845. Pt resting in bed. AOX4. VSS. Up at flor with steady
gait. Has been NPO since midnight for possible surgery. Gave hydrocodone once
for c/o posterior neck pain at abscess site. Resp even and unlabored. No
identified needs at the moment. Will continue to monitor.

## 2019-07-03 NOTE — NUR
CONSULTED TO PLACE A PICC FOR A PATIENT POST SURGICAL PROCEDURE WITH IV
ANTIBIOTICS NEEDED. ORDER AND CONSENT NOTED. THE PROCEDURE AS WELL AS BENIFITS
AND RISKS DISCUSSED WITH THE PATIENT AND SHE VERBALIZED UNDERSTANDING. THE
LEFT UPPER ARM BASILIC WAS WIDLEY PATENT. A #4F DOUBLE LUMEN POWER PICC WAS
PLACED PER HOSPITAL POLICY AFTER A BEDSIDE TIMEOUT WAS COMPLETED. PICC WAS
TRIMMED TO 52CM AND ADVANCED WITHOUT DIFFICULTY TO 51CM. A STAT CHEST XRAY WAS
ORDERED FOR CONFIRMATION.

## 2019-07-03 NOTE — NUR
received voice message from [facility] ([facility] ) , able to assist with dcp if needing
home iv, [facility] home infusion, if needs wound vac- [facility] wound vac. can have 50
nursing visits per year and she has not used any. don't think she going to
skilled she is to young. if have any question on needs call [phone] ext
[phone]"/elizabeth with [facility]. information passed on to cm/sw team

## 2019-07-04 VITALS — DIASTOLIC BLOOD PRESSURE: 72 MMHG | SYSTOLIC BLOOD PRESSURE: 133 MMHG

## 2019-07-04 VITALS — SYSTOLIC BLOOD PRESSURE: 147 MMHG | DIASTOLIC BLOOD PRESSURE: 75 MMHG

## 2019-07-04 VITALS — SYSTOLIC BLOOD PRESSURE: 143 MMHG | DIASTOLIC BLOOD PRESSURE: 86 MMHG

## 2019-07-04 VITALS — SYSTOLIC BLOOD PRESSURE: 147 MMHG | DIASTOLIC BLOOD PRESSURE: 79 MMHG

## 2019-07-04 LAB
ANION GAP SERPL CALC-SCNC: 9 MMOL/L (ref 7–16)
BUN SERPL-MCNC: 4 MG/DL (ref 7–18)
CALCIUM SERPL-MCNC: 8.5 MG/DL (ref 8.5–10.1)
CHLORIDE SERPL-SCNC: 103 MMOL/L (ref 98–107)
CO2 SERPL-SCNC: 28 MMOL/L (ref 21–32)
CREAT SERPL-MCNC: 0.6 MG/DL (ref 0.6–1)
ERYTHROCYTE [DISTWIDTH] IN BLOOD BY AUTOMATED COUNT: 18.4 % (ref 10.5–14.5)
GLUCOSE SERPL-MCNC: 136 MG/DL (ref 74–106)
HCT VFR BLD CALC: 29.9 % (ref 37–47)
HGB BLD-MCNC: 9.2 GM/DL (ref 12–15)
MAGNESIUM SERPL-MCNC: 2 MG/DL (ref 1.8–2.4)
MCH RBC QN AUTO: 22.7 PG (ref 26–34)
MCHC RBC AUTO-ENTMCNC: 30.8 G/DL (ref 28–37)
MCV RBC: 73.6 FL (ref 80–100)
PLATELET # BLD: 320 THOU/UL (ref 150–400)
POTASSIUM SERPL-SCNC: 4.1 MMOL/L (ref 3.5–5.1)
RBC # BLD AUTO: 4.06 MIL/UL (ref 4.2–5)
SODIUM SERPL-SCNC: 140 MMOL/L (ref 136–145)
WBC # BLD AUTO: 7.5 THOU/UL (ref 4–11)

## 2019-07-04 NOTE — NUR
PT STABLE THROUGHOUT SHIFT. PT C/O PAIN, ADDRESSED WITH MEDICATION WHICH
OFFERED SUBSTANTIAL RELIEF. PT HAD NO OTHER COMPLAINTS. CHANGED DRESSING,
FAMILY IN TO VISIT. PT RESTING COMFORTABLY.

## 2019-07-04 NOTE — NUR
Pt. rested quietly at intervals during the night when checked on during
frequent rounds.  She c/o neck pain and was given po pain meds (see emar)
with some relief noted.  Dressing to neck is intact.

## 2019-07-05 VITALS — SYSTOLIC BLOOD PRESSURE: 152 MMHG | DIASTOLIC BLOOD PRESSURE: 93 MMHG

## 2019-07-05 VITALS — DIASTOLIC BLOOD PRESSURE: 100 MMHG | SYSTOLIC BLOOD PRESSURE: 149 MMHG

## 2019-07-05 VITALS — DIASTOLIC BLOOD PRESSURE: 66 MMHG | SYSTOLIC BLOOD PRESSURE: 142 MMHG

## 2019-07-05 VITALS — DIASTOLIC BLOOD PRESSURE: 87 MMHG | SYSTOLIC BLOOD PRESSURE: 147 MMHG

## 2019-07-05 NOTE — NUR
WINDY reviewed chart and spoke with nursing. Pt has second I&D yesterday for neck
abscess. Pt remains on IV abx-Unasyn. Pt has PICC line in place. [facility] is following for home IV abx. Discharge planner to fax
additional info. WINDY contacted ID physician for final recommendations for IV
abx. [facility] is following for  services. Contact info for both agencies if pt
is ready for discharge home over the weekend. Final discharge orders and
summary will need to be faxed when available. [facility]INDY is available to assist as
needed.
[facility]--Phone: [phone]   Fax: [phone]
[facility]--Phone: [phone]   Fax: [phone]

## 2019-07-05 NOTE — NUR
ASSUMED CARE AROUND 1900. AXOX4. POSTERIOR NECK DRESSING DRANING SANGUINEOUS
DRAIN. DRESSING CHNAGED. PAIN TX PER MD ORDER. GERRI PICC INTACT. NO S/S ACUTE
DISTRESS NOTED OR REPORTED AT THIS TIME. WILL CONT TO MONITOR FOR ANY CHANGES
IN CONDITION.

## 2019-07-05 NOTE — NUR
PT STABLE THROUGHOUT SHIFT. CHANGED DRESSING ON WOUND, PT TOLERATED WELL. PT
C/O PAIN, MEDICATION ALLEVIATED PARTIALLY. FAMILY AT BEDSIDE.

## 2019-07-05 NOTE — NUR
DISCHARGE PLANNING.  ANTICIPATED DISCHARGE TO HOME NEXT WEEK.  PATIENT WILL
NEED IV ANTIBIOTICS AT DISCHARGE.  REFERRAL FAXED TO [facility], [facility],
[facility] FOR IV ANTIOBIOTIC NEEDS.  CALL PLACED TO [facility] TO NOTIFY.  AWAITING
CALL BACK.  FOLLOWING TO ASSIST WITH DISCHARGE.

## 2019-07-06 VITALS — DIASTOLIC BLOOD PRESSURE: 88 MMHG | SYSTOLIC BLOOD PRESSURE: 173 MMHG

## 2019-07-06 VITALS — DIASTOLIC BLOOD PRESSURE: 90 MMHG | SYSTOLIC BLOOD PRESSURE: 146 MMHG

## 2019-07-06 VITALS — SYSTOLIC BLOOD PRESSURE: 124 MMHG | DIASTOLIC BLOOD PRESSURE: 68 MMHG

## 2019-07-06 VITALS — SYSTOLIC BLOOD PRESSURE: 141 MMHG | DIASTOLIC BLOOD PRESSURE: 80 MMHG

## 2019-07-06 LAB
ANION GAP SERPL CALC-SCNC: 8 MMOL/L (ref 7–16)
BUN SERPL-MCNC: 5 MG/DL (ref 7–18)
CALCIUM SERPL-MCNC: 8.7 MG/DL (ref 8.5–10.1)
CHLORIDE SERPL-SCNC: 103 MMOL/L (ref 98–107)
CO2 SERPL-SCNC: 28 MMOL/L (ref 21–32)
CREAT SERPL-MCNC: 0.7 MG/DL (ref 0.6–1)
ERYTHROCYTE [DISTWIDTH] IN BLOOD BY AUTOMATED COUNT: 18.5 % (ref 10.5–14.5)
GLUCOSE SERPL-MCNC: 150 MG/DL (ref 74–106)
HCT VFR BLD CALC: 30.3 % (ref 37–47)
HGB BLD-MCNC: 9.4 GM/DL (ref 12–15)
MCH RBC QN AUTO: 23 PG (ref 26–34)
MCHC RBC AUTO-ENTMCNC: 30.9 G/DL (ref 28–37)
MCV RBC: 74.3 FL (ref 80–100)
PLATELET # BLD: 346 THOU/UL (ref 150–400)
POTASSIUM SERPL-SCNC: 4 MMOL/L (ref 3.5–5.1)
RBC # BLD AUTO: 4.07 MIL/UL (ref 4.2–5)
SODIUM SERPL-SCNC: 139 MMOL/L (ref 136–145)
WBC # BLD AUTO: 6.7 THOU/UL (ref 4–11)

## 2019-07-06 NOTE — NUR
ASSUMED CARE AROUND 1900. AXOX4. DRESSING CHANGED PER MD ORDER. NO S.S ACUTE
DISTRESS NOTED OR REPORTED AT THIS TIME. WILL CONT TO MONITOR FOR ANY CHANGES
IN CONDITION.

## 2019-07-06 NOTE — NUR
PT STABLE THROUGHOUT SHIFT. C/O PAIN ADDRESSED WITH MEDICATION. CHANGED
DRESSING ON NECK 3X. FAMILY AT BEDSIDE. PT RESTING COMFORTABLY.

## 2019-07-07 VITALS — DIASTOLIC BLOOD PRESSURE: 72 MMHG | SYSTOLIC BLOOD PRESSURE: 125 MMHG

## 2019-07-07 VITALS — DIASTOLIC BLOOD PRESSURE: 73 MMHG | SYSTOLIC BLOOD PRESSURE: 128 MMHG

## 2019-07-07 VITALS — SYSTOLIC BLOOD PRESSURE: 147 MMHG | DIASTOLIC BLOOD PRESSURE: 83 MMHG

## 2019-07-07 VITALS — SYSTOLIC BLOOD PRESSURE: 117 MMHG | DIASTOLIC BLOOD PRESSURE: 78 MMHG

## 2019-07-07 LAB
ANION GAP SERPL CALC-SCNC: 8 MMOL/L (ref 7–16)
BUN SERPL-MCNC: 6 MG/DL (ref 7–18)
CALCIUM SERPL-MCNC: 8.7 MG/DL (ref 8.5–10.1)
CHLORIDE SERPL-SCNC: 103 MMOL/L (ref 98–107)
CO2 SERPL-SCNC: 27 MMOL/L (ref 21–32)
CREAT SERPL-MCNC: 0.6 MG/DL (ref 0.6–1)
ERYTHROCYTE [DISTWIDTH] IN BLOOD BY AUTOMATED COUNT: 19.1 % (ref 10.5–14.5)
GLUCOSE SERPL-MCNC: 162 MG/DL (ref 74–106)
HCT VFR BLD CALC: 30.5 % (ref 37–47)
HGB BLD-MCNC: 9.4 GM/DL (ref 12–15)
MCH RBC QN AUTO: 22.9 PG (ref 26–34)
MCHC RBC AUTO-ENTMCNC: 30.9 G/DL (ref 28–37)
MCV RBC: 74 FL (ref 80–100)
PLATELET # BLD: 346 THOU/UL (ref 150–400)
POTASSIUM SERPL-SCNC: 3.9 MMOL/L (ref 3.5–5.1)
RBC # BLD AUTO: 4.13 MIL/UL (ref 4.2–5)
SODIUM SERPL-SCNC: 138 MMOL/L (ref 136–145)
WBC # BLD AUTO: 6.4 THOU/UL (ref 4–11)

## 2019-07-07 NOTE — NUR
PT STABLE THROUGHOUT SHIFT. CHANGED DRESSING, WOUND APPEARS TO BE HEALING
WELL. NO NEW CONCERNS, PT C/O PAIN WHICH WAS ALLEVIATED VIA MEDICATION. FAMILY
AT BEDSIDE. PT RESTING.

## 2019-07-08 VITALS — DIASTOLIC BLOOD PRESSURE: 85 MMHG | SYSTOLIC BLOOD PRESSURE: 118 MMHG

## 2019-07-08 VITALS — DIASTOLIC BLOOD PRESSURE: 80 MMHG | SYSTOLIC BLOOD PRESSURE: 122 MMHG

## 2019-07-08 VITALS — SYSTOLIC BLOOD PRESSURE: 118 MMHG | DIASTOLIC BLOOD PRESSURE: 70 MMHG

## 2019-07-08 VITALS — SYSTOLIC BLOOD PRESSURE: 118 MMHG | DIASTOLIC BLOOD PRESSURE: 85 MMHG

## 2019-07-08 NOTE — NUR
ASSUMED CARE AROUND 1900. AXOX4. POSTERIOR NECT DRESSING CDI. NO S/S ACUTE
DISTRESS NOTED OR REPORTED AT THIS TIME. WILL CONT TO MONITOR FOR ANY CHANGES
IN CONDITION.

## 2019-07-08 NOTE — NUR
ASSUMED CARE AT 0700, SHIFT ASSESSMNET DONE, MEDS GIVEN, REPORTED PAIN, PRN
PAIN MEDS GIVEN. DRESSING CHANGE TO NECK WAS PERFORMED, PICTURE TAKEN. UP AD
BLADIMIR. DISCHARGE ORDER RECEIVED, PICC LINE IN PLACE FOR IV ANTIBIOTICS. WILL
CONTINUE TO ASSESS AND ASSIST WITH ADLs AS NEEDED.

## 2019-07-08 NOTE — NUR
DISCHARGE NOTE:
WINDY reviewed chart and spoke with nursing, attending physician and ID
physician. Pt is medically stable for discharge home today. Pt to need IV
Unasyn 12 gm daily for 7 days. It will need to be a continuous infusion. WINDY
faxed info to [facility] and spoke with Malu. Pt's copay if she has not met her
out of pocket deductible is $7.94/day until OOP is met.  Once OOP is met, pt
will be covered at 100%. WINDY met with pt at bedside to provide update. Pt is
aware and agreeable with copay. WINDY explained that [facility] will come provide
education and teaching prior to discharge. WINDY faxed script to [facility]. [facility] RN
to come provide bedside teaching this afternoon. [facility]
Dr. Ibanez to follow for  orders. Pt will follow up with Dr. Le next
Monday.  Contact info for [facility] and [facility] placed in [facility] summary. Pt's
family will provide transportation home. Patient Choice of Vendor form signed
and placed on pt's chart. [facility] is available to assist should needs arise.

## 2019-07-12 ENCOUNTER — HOSPITAL ENCOUNTER (OUTPATIENT)
Dept: HOSPITAL 35 - CAT | Age: 44
End: 2019-07-12
Attending: INTERNAL MEDICINE
Payer: COMMERCIAL

## 2019-07-12 DIAGNOSIS — R59.1: ICD-10-CM

## 2019-07-12 DIAGNOSIS — L02.11: Primary | ICD-10-CM

## 2019-07-15 ENCOUNTER — HOSPITAL ENCOUNTER (OUTPATIENT)
Dept: HOSPITAL 35 - CAT | Age: 44
End: 2019-07-15
Attending: INTERNAL MEDICINE
Payer: COMMERCIAL

## 2019-07-15 ENCOUNTER — HOSPITAL ENCOUNTER (OUTPATIENT)
Dept: HOSPITAL 35 - HYPER | Age: 44
End: 2019-07-15
Attending: EMERGENCY MEDICINE
Payer: COMMERCIAL

## 2019-07-15 DIAGNOSIS — L02.11: ICD-10-CM

## 2019-07-15 DIAGNOSIS — R16.0: ICD-10-CM

## 2019-07-15 DIAGNOSIS — J84.10: Primary | ICD-10-CM

## 2019-07-15 DIAGNOSIS — J45.909: ICD-10-CM

## 2019-07-15 DIAGNOSIS — E78.5: ICD-10-CM

## 2019-07-15 DIAGNOSIS — Z87.891: ICD-10-CM

## 2019-07-15 DIAGNOSIS — Z86.718: ICD-10-CM

## 2019-07-15 DIAGNOSIS — K76.0: ICD-10-CM

## 2019-07-15 DIAGNOSIS — Z79.84: ICD-10-CM
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
## 2019-07-15 DIAGNOSIS — Z86.711: ICD-10-CM

## 2019-07-15 DIAGNOSIS — T81.49XD: Primary | ICD-10-CM

## 2019-07-22 ENCOUNTER — HOSPITAL ENCOUNTER (OUTPATIENT)
Dept: HOSPITAL 35 - OPONC | Age: 44
End: 2019-07-22
Attending: SPECIALIST
Payer: COMMERCIAL

## 2019-07-22 VITALS — DIASTOLIC BLOOD PRESSURE: 87 MMHG | SYSTOLIC BLOOD PRESSURE: 133 MMHG

## 2019-07-22 DIAGNOSIS — J86.9: ICD-10-CM

## 2019-07-22 DIAGNOSIS — Z45.2: Primary | ICD-10-CM

## 2019-07-22 DIAGNOSIS — L02.11: ICD-10-CM

## 2019-07-22 DIAGNOSIS — E66.9: ICD-10-CM

## 2019-07-22 DIAGNOSIS — B95.2: ICD-10-CM

## 2019-07-22 DIAGNOSIS — E11.9: ICD-10-CM

## 2019-07-22 LAB
ALBUMIN SERPL-MCNC: 2.7 G/DL (ref 3.4–5)
ALT SERPL-CCNC: 22 U/L (ref 30–65)
ANION GAP SERPL CALC-SCNC: 7 MMOL/L (ref 7–16)
AST SERPL-CCNC: 16 U/L (ref 15–37)
BILIRUB SERPL-MCNC: 0.1 MG/DL
BUN SERPL-MCNC: 7 MG/DL (ref 7–18)
CALCIUM SERPL-MCNC: 8.3 MG/DL (ref 8.5–10.1)
CHLORIDE SERPL-SCNC: 104 MMOL/L (ref 98–107)
CO2 SERPL-SCNC: 28 MMOL/L (ref 21–32)
CREAT SERPL-MCNC: 0.6 MG/DL (ref 0.6–1)
ERYTHROCYTE [DISTWIDTH] IN BLOOD BY AUTOMATED COUNT: 18.7 % (ref 10.5–14.5)
GLUCOSE SERPL-MCNC: 205 MG/DL (ref 74–106)
HCT VFR BLD CALC: 28.9 % (ref 37–47)
HGB BLD-MCNC: 9.3 GM/DL (ref 12–15)
MCH RBC QN AUTO: 23.2 PG (ref 26–34)
MCHC RBC AUTO-ENTMCNC: 32 G/DL (ref 28–37)
MCV RBC: 72.6 FL (ref 80–100)
PLATELET # BLD: 288 THOU/UL (ref 150–400)
POTASSIUM SERPL-SCNC: 3.4 MMOL/L (ref 3.5–5.1)
PROT SERPL-MCNC: 7.3 G/DL (ref 6.4–8.2)
RBC # BLD AUTO: 3.99 MIL/UL (ref 4.2–5)
SODIUM SERPL-SCNC: 139 MMOL/L (ref 136–145)
WBC # BLD AUTO: 5.2 THOU/UL (ref 4–11)

## 2019-07-22 PROCEDURE — 95113: CPT

## 2019-07-22 NOTE — NUR
HERE FOR F/U ID VISIT, THIS TIME WITH DR. DEVIN LONGORIA WHO IS COVERING FOR DR. ACEVEDO. PT THOUGHT PICC LINE WAS PULLED OUT MORE THAN PREVIOUSLY AND
REPORTED THAT SHE NOR HER HOME HEALTH NURSE HAVE BEEN ABLE TO GET A BLOOD
RETURN FOR ABOUT A WEEK NOW. HAD VASCULAR ACCESS NURSE ASSESS. SHE CHANGED
DRESSING AND CONFIRMED THAT THE EXTERNAL LENGTH IS THE SAME AS WHEN IT WAS
PLACED (HAD TO BE PULLED OUT SOME WHEN RADIOLOGY ASSESSED PLACEMENT). THERE IS
A SMALL KINK AT THE HUB BUT WITH EVEN AFTER DRESSING CHANGE AND BOTH LINES
FLUSHED EASILY IT STILL HAD NO BLOOD RETURN. ORDER RECEIVED FOR CATH DAE.
INSTILLED 2ML IN EACH LINE (TOTAL OF 4ML GIVEN). AFTER DWELL TIME OF 60
MINUTES, GOOD BLOOD RETURN FROM EACH. LABS DRAWN.
DR. LONGORIA ASSESSED PT. NEW AQUACEL PLACED INTO CERVICAL NECK WOUND. PT WILL SEE
WOUND CARE ON 7/29. RETURN VISIT WITH DR. LONGORIA SET UP FOR THE SAME DAY.
ASSISTED PT BY MAKING APPT WITH PULMONOLOGIST, DR. HOLLINS, AND SET PT UP TO SEE A
PCP (SHE CURRENTLY HAS NONE), DR. GUTIERREZ FOR 9/26, FIRST AVAILABLE APPT. PT
REQUESTED DR. DOUGHERTY BUT HE IS NOT TAKING NEW PTS AND PT AGREEABLE TO SEE DR. GUTIERREZ. ALSO SENT ORDER TO SCHEDULING AND REQUESTED PRIOR AUTH INITIATION FROM
DR. LONGORIA'S OFFICE FOR THE ECHO THAT IS ORDERED.
ALL LAB AND ORDER [facility]IN[facility] AND UPDATE GIVEN TO
PT'S  NURSE, BRIAN PERES ORDERS, STATUS OF PICC, ALL NEW APPTS, ETC.
PT VERBALIZED UNDERSTANDING OF PLAN. RESUMED HER AMPICILLIN CONTINUOUS
INFUSION AFTER CATH DAE DWELL TIME COMPLETED. DISMISSED IN STABLE CONDITION.
SCHEDULED TO RETURN NEXT WEEK ON MONDAY.

## 2019-07-29 ENCOUNTER — HOSPITAL ENCOUNTER (OUTPATIENT)
Dept: HOSPITAL 35 - OPONC | Age: 44
End: 2019-07-29
Attending: SPECIALIST
Payer: COMMERCIAL

## 2019-07-29 VITALS — SYSTOLIC BLOOD PRESSURE: 143 MMHG | DIASTOLIC BLOOD PRESSURE: 83 MMHG

## 2019-07-29 DIAGNOSIS — Z86.711: ICD-10-CM

## 2019-07-29 DIAGNOSIS — J45.909: ICD-10-CM

## 2019-07-29 DIAGNOSIS — Z87.891: ICD-10-CM

## 2019-07-29 DIAGNOSIS — Z86.718: ICD-10-CM
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
## 2019-07-29 DIAGNOSIS — T81.49XD: Primary | ICD-10-CM

## 2019-07-29 DIAGNOSIS — E78.5: ICD-10-CM

## 2019-07-29 DIAGNOSIS — Z79.84: ICD-10-CM

## 2019-07-29 DIAGNOSIS — L02.11: ICD-10-CM

## 2019-07-29 LAB
%HYPO/RBC NFR BLD AUTO: (no result) %
ALBUMIN SERPL-MCNC: 3 G/DL (ref 3.4–5)
ALT SERPL-CCNC: 27 U/L (ref 30–65)
ANION GAP SERPL CALC-SCNC: 7 MMOL/L (ref 7–16)
ANISOCYTOSIS BLD QL SMEAR: (no result)
AST SERPL-CCNC: 21 U/L (ref 15–37)
BASOPHILS NFR BLD AUTO: 1.1 % (ref 0–2)
BILIRUB SERPL-MCNC: 0.3 MG/DL
BUN SERPL-MCNC: 7 MG/DL (ref 7–18)
CALCIUM SERPL-MCNC: 8.7 MG/DL (ref 8.5–10.1)
CHLORIDE SERPL-SCNC: 104 MMOL/L (ref 98–107)
CO2 SERPL-SCNC: 27 MMOL/L (ref 21–32)
CREAT SERPL-MCNC: 0.6 MG/DL (ref 0.6–1)
EOSINOPHIL NFR BLD: 3.9 % (ref 0–3)
ERYTHROCYTE [DISTWIDTH] IN BLOOD BY AUTOMATED COUNT: 18.8 % (ref 10.5–14.5)
GLUCOSE SERPL-MCNC: 152 MG/DL (ref 74–106)
GRANULOCYTES NFR BLD MANUAL: 54.2 % (ref 36–66)
HCT VFR BLD CALC: 30.3 % (ref 37–47)
HGB BLD-MCNC: 9.2 GM/DL (ref 12–15)
LYMPHOCYTES NFR BLD AUTO: 33.2 % (ref 24–44)
MCH RBC QN AUTO: 21.8 PG (ref 26–34)
MCHC RBC AUTO-ENTMCNC: 30.3 G/DL (ref 28–37)
MCV RBC: 72.2 FL (ref 80–100)
MICROCYTES: (no result)
MONOCYTES NFR BLD: 7.6 % (ref 1–8)
NEUTROPHILS # BLD: 3.1 THOU/UL (ref 1.4–8.2)
PLATELET # BLD EST: NORMAL 10*3/UL
PLATELET # BLD: 249 THOU/UL (ref 150–400)
POTASSIUM SERPL-SCNC: 4.2 MMOL/L (ref 3.5–5.1)
PROT SERPL-MCNC: 7.5 G/DL (ref 6.4–8.2)
RBC # BLD AUTO: 4.2 MIL/UL (ref 4.2–5)
SODIUM SERPL-SCNC: 138 MMOL/L (ref 136–145)
WBC # BLD AUTO: 5.7 THOU/UL (ref 4–11)

## 2019-07-29 PROCEDURE — 91017: CPT

## 2019-07-29 NOTE — NUR
IN FOR LABS, PICC LINE DRESSING CHANGE AND SEE DR. LONGORIA. UNABLE TO DRAW LAB
FROM PICC LINE AS NO BLOOD RETURN. LABS DRAWN PERIPHERALLY BY . DR. LONGORIA VISITED. NEW ORDERS WRITTEN. ECHO RESCHEDULED FOR TOMORROW AT 11 AM.
PATIENT NOTIFIED. CHANGED PICC DRESSING. SITE WNL. PATIENT CAME TO US FROM
WOUND CLINIC. DRESSING TO POSTERIOR NECK C/D/I. PATIENT TO RETURN NEXT MONDAY
FOR F/U VISIT. DISMISSED IN STABLE CONDITION.

## 2019-07-30 ENCOUNTER — HOSPITAL ENCOUNTER (OUTPATIENT)
Dept: HOSPITAL 35 - CV | Age: 44
End: 2019-07-30
Attending: SPECIALIST
Payer: COMMERCIAL

## 2019-07-30 DIAGNOSIS — E11.9: ICD-10-CM

## 2019-07-30 DIAGNOSIS — I27.20: ICD-10-CM

## 2019-07-30 DIAGNOSIS — I38: Primary | ICD-10-CM

## 2019-07-30 DIAGNOSIS — B95.2: ICD-10-CM

## 2019-08-05 ENCOUNTER — HOSPITAL ENCOUNTER (OUTPATIENT)
Dept: HOSPITAL 35 - OPONC | Age: 44
End: 2019-08-05
Attending: SPECIALIST
Payer: COMMERCIAL

## 2019-08-05 VITALS — DIASTOLIC BLOOD PRESSURE: 86 MMHG | SYSTOLIC BLOOD PRESSURE: 136 MMHG

## 2019-08-05 DIAGNOSIS — K02.9: ICD-10-CM

## 2019-08-05 DIAGNOSIS — E11.9: ICD-10-CM

## 2019-08-05 DIAGNOSIS — E66.9: ICD-10-CM

## 2019-08-05 DIAGNOSIS — L02.11: Primary | ICD-10-CM

## 2019-08-05 LAB
%HYPO/RBC NFR BLD AUTO: (no result) %
ALBUMIN SERPL-MCNC: 2.9 G/DL (ref 3.4–5)
ALT SERPL-CCNC: 35 U/L (ref 30–65)
ANION GAP SERPL CALC-SCNC: 10 MMOL/L (ref 7–16)
ANISOCYTOSIS BLD QL SMEAR: (no result)
AST SERPL-CCNC: 20 U/L (ref 15–37)
BASOPHILS NFR BLD AUTO: 1.5 % (ref 0–2)
BILIRUB SERPL-MCNC: 0.4 MG/DL
BUN SERPL-MCNC: 8 MG/DL (ref 7–18)
CALCIUM SERPL-MCNC: 8.7 MG/DL (ref 8.5–10.1)
CHLORIDE SERPL-SCNC: 104 MMOL/L (ref 98–107)
CO2 SERPL-SCNC: 24 MMOL/L (ref 21–32)
CREAT SERPL-MCNC: 0.6 MG/DL (ref 0.6–1)
EOSINOPHIL NFR BLD: 5 % (ref 0–3)
ERYTHROCYTE [DISTWIDTH] IN BLOOD BY AUTOMATED COUNT: 18.5 % (ref 10.5–14.5)
GLUCOSE SERPL-MCNC: 164 MG/DL (ref 74–106)
GRANULOCYTES NFR BLD MANUAL: 54.7 % (ref 36–66)
HCT VFR BLD CALC: 30.3 % (ref 37–47)
HGB BLD-MCNC: 9.3 GM/DL (ref 12–15)
LYMPHOCYTES NFR BLD AUTO: 30.6 % (ref 24–44)
MCH RBC QN AUTO: 21.6 PG (ref 26–34)
MCHC RBC AUTO-ENTMCNC: 30.5 G/DL (ref 28–37)
MCV RBC: 70.8 FL (ref 80–100)
MICROCYTES: (no result)
MONOCYTES NFR BLD: 8.2 % (ref 1–8)
NEUTROPHILS # BLD: 3 THOU/UL (ref 1.4–8.2)
PLATELET # BLD EST: NORMAL 10*3/UL
PLATELET # BLD: 243 THOU/UL (ref 150–400)
POTASSIUM SERPL-SCNC: 3.3 MMOL/L (ref 3.5–5.1)
PROT SERPL-MCNC: 7.6 G/DL (ref 6.4–8.2)
RBC # BLD AUTO: 4.28 MIL/UL (ref 4.2–5)
SODIUM SERPL-SCNC: 138 MMOL/L (ref 136–145)
WBC # BLD AUTO: 5.5 THOU/UL (ref 4–11)

## 2019-08-05 PROCEDURE — 91018: CPT

## 2019-08-05 NOTE — NUR
HERE TO SEE DR. LONGORIA IN CLINIC AND FOR LABS/PICC DRESSING CARE. REPORTS DOING
WELL WITH HOME INFUSIONS. [facility] SUPPLYING MED. PT HAS CONTINUOUS INFUSION PUMP
AND 2 LUMEN PICC LINE. NO BLOOD RETURN FOR OVER A WEEK BUT BOTH LUMENS FLUSH
EASILY. LINE HAS NOT APPEARED TO PULL OUT ANY FURTHER THAN THE ORIGINAL
PLACEMENT LENGTH BUT IT IS OUT SEVERAL CM'S. LABS DRAWN PERIPHERALLY BY LAB
PHLEBOTOMIST TODAY. SEEN BY DR. LONGORIA WITH ORDERS RECEIVED TO CONTINUE UNASYN
FOR ANOTHER WEEK.
PICC DRESSING CHANGE DONE, SITE LOOKS GOOD. CHANGED NECK DRESSING AFTER BEING
EVALUATED BY DR. LONGORIA. WOUND IS CLOSING NICELY BUT STILL HAS A SMALL OPENING.
PACKED LIGHTLY WITH SALINE SOAKED GAUZE. DTR DOES DRESSING AT HOME EVERY
COUPLE DAYS FOR PT.
PT DENIED N/V/DIARRHEA, FEVER/CHILLS. STILL HAS NOT SEEN THE DENTIST BUT DID
SEE DR. HOLLINS. AWAITING SCHEDULED TIME FOR BRONCH. STILL HAS NEW PT APPT SET UP
WITH DR. GUTIERREZ IN SEP. TO ESTABLISH A RELATIONSHIP WITH A PCP. PT ENCOURAGED
TO GET IN TO SEE THE DENTIST ASAP AND F/U WITH DR. HOLLINS'S OFFICE TO SET UP
BRONCH. PLAN IS TO F/U AT THIS POINT WITH DR. ACEVEDO. DISMISSED IN STABLE
CONDITION.

## 2019-08-05 NOTE — NUR
SPOKE WITH DR. ACEVEDO WHO STATES HE SPOKE WITH DR. LONGORIA ABOUT CONTINUING
TO CARE FOR THIS PATIENT. PT NOTIFIED AND SET UP FOR RETURN VISIT HERE ON
8/12. ORDERS FAXED TO [facility] AND SPOKE WITH JESUS REGARDING CONTINUATION
OF UNASYM PER PICC LINE. PT ENCOURAGED AGAIN TO CALL DR. HOLLINS'S OFFICE TO GET
BRONCH SCHEDULED AND TO GET IN TO SEE HER DENTIST ASAP.

## 2019-08-08 ENCOUNTER — HOSPITAL ENCOUNTER (OUTPATIENT)
Dept: HOSPITAL 35 - TBA | Age: 44
Discharge: HOME | End: 2019-08-08
Attending: INTERNAL MEDICINE
Payer: COMMERCIAL

## 2019-08-08 VITALS — SYSTOLIC BLOOD PRESSURE: 138 MMHG | DIASTOLIC BLOOD PRESSURE: 81 MMHG

## 2019-08-08 DIAGNOSIS — Z79.899: ICD-10-CM

## 2019-08-08 DIAGNOSIS — R04.89: ICD-10-CM

## 2019-08-08 DIAGNOSIS — J98.4: Primary | ICD-10-CM

## 2019-08-08 DIAGNOSIS — Z98.890: ICD-10-CM

## 2019-08-08 DIAGNOSIS — E11.9: ICD-10-CM

## 2019-08-08 PROCEDURE — 70005: CPT

## 2019-08-08 PROCEDURE — 62900: CPT

## 2019-08-08 PROCEDURE — 50010 RENAL EXPLORATION: CPT

## 2019-08-08 PROCEDURE — 62110: CPT

## 2019-08-12 ENCOUNTER — HOSPITAL ENCOUNTER (OUTPATIENT)
Dept: HOSPITAL 35 - HYPER | Age: 44
End: 2019-08-12
Attending: PREVENTIVE MEDICINE
Payer: COMMERCIAL

## 2019-08-12 VITALS — SYSTOLIC BLOOD PRESSURE: 135 MMHG | DIASTOLIC BLOOD PRESSURE: 80 MMHG

## 2019-08-12 DIAGNOSIS — E78.5: ICD-10-CM
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
## 2019-08-12 DIAGNOSIS — L02.11: ICD-10-CM

## 2019-08-12 DIAGNOSIS — Z86.711: ICD-10-CM

## 2019-08-12 DIAGNOSIS — Z86.718: ICD-10-CM

## 2019-08-12 DIAGNOSIS — Z87.891: ICD-10-CM

## 2019-08-12 DIAGNOSIS — Z79.84: ICD-10-CM

## 2019-08-12 DIAGNOSIS — T81.49XD: Primary | ICD-10-CM

## 2019-08-12 DIAGNOSIS — E11.9: ICD-10-CM

## 2019-08-12 DIAGNOSIS — J45.909: ICD-10-CM

## 2019-08-12 LAB
%HYPO/RBC NFR BLD AUTO: (no result) %
ALBUMIN SERPL-MCNC: 3.1 G/DL (ref 3.4–5)
ALT SERPL-CCNC: 29 U/L (ref 30–65)
ANION GAP SERPL CALC-SCNC: 11 MMOL/L (ref 7–16)
ANISOCYTOSIS BLD QL SMEAR: (no result)
AST SERPL-CCNC: 17 U/L (ref 15–37)
BASOPHILS NFR BLD AUTO: 1.1 % (ref 0–2)
BILIRUB SERPL-MCNC: 0.2 MG/DL
BUN SERPL-MCNC: 10 MG/DL (ref 7–18)
CALCIUM SERPL-MCNC: 9.1 MG/DL (ref 8.5–10.1)
CHLORIDE SERPL-SCNC: 100 MMOL/L (ref 98–107)
CO2 SERPL-SCNC: 25 MMOL/L (ref 21–32)
CREAT SERPL-MCNC: 0.6 MG/DL (ref 0.6–1)
EOSINOPHIL NFR BLD: 5.4 % (ref 0–3)
ERYTHROCYTE [DISTWIDTH] IN BLOOD BY AUTOMATED COUNT: 19.1 % (ref 10.5–14.5)
GLUCOSE SERPL-MCNC: 170 MG/DL (ref 74–106)
GRANULOCYTES NFR BLD MANUAL: 52.5 % (ref 36–66)
HCT VFR BLD CALC: 31.8 % (ref 37–47)
HGB BLD-MCNC: 9.7 GM/DL (ref 12–15)
LYMPHOCYTES NFR BLD AUTO: 33.1 % (ref 24–44)
MCH RBC QN AUTO: 21.4 PG (ref 26–34)
MCHC RBC AUTO-ENTMCNC: 30.5 G/DL (ref 28–37)
MCV RBC: 70.1 FL (ref 80–100)
MICROCYTES: (no result)
MONOCYTES NFR BLD: 7.9 % (ref 1–8)
NEUTROPHILS # BLD: 3.1 THOU/UL (ref 1.4–8.2)
PLATELET # BLD EST: NORMAL 10*3/UL
PLATELET # BLD: 289 THOU/UL (ref 150–400)
POTASSIUM SERPL-SCNC: 3.8 MMOL/L (ref 3.5–5.1)
PROT SERPL-MCNC: 8.1 G/DL (ref 6.4–8.2)
RBC # BLD AUTO: 4.54 MIL/UL (ref 4.2–5)
SODIUM SERPL-SCNC: 136 MMOL/L (ref 136–145)
WBC # BLD AUTO: 5.9 THOU/UL (ref 4–11)

## 2019-08-12 PROCEDURE — 91018: CPT

## 2019-08-12 NOTE — NUR
PT CAME TO CLINIC FROM THE WOUND CARE CLINIC WHERE SHE HAD JUST SEEN DR. LUONG AND HAD DRESSING ON NECK CHANGED. PICC SITE LOOKS GOOD. CHANGED
DRESSING WHEN PT FIRST ARRIVED. LABS DRAWN PERIPHERALLY BY PHLEBOTOMIST. PT
REPORTS DOING WELL, FEELING WELL--FEELS NORMAL. HAD BRONCH DONE. PLANS TO GO
BY THE DENTIST OFFICE TODAY TO PAY AN OUTSTANDING BILL THEN MAKE AN APPT. DR. LONGORIA ROUNDED, ORDERS RECEIVED TO PULL PICC AND FOR PT TO START ON ORAL
AUGMENTIN 875MG BID. LINE PULLED WITHOUT INCIDENT. SCRIPT GIVEN TO PT WITH
INSTRUCTIONS TO BEGIN TONITE. PT SCHEDULED TO RETURN HERE TO SEE DR. LONGORIA
AGAIN ON 8/26 JUST BEFORE HER APPT WITH DR. LUONG. VERBALIZED UNDERSTANDING
OF PLAN. DISMISSED IN STABLE CONDITION.

## 2019-08-12 NOTE — HC
[facility]
[address]
[place]   [address]                     CONSULTATION                  
_______________________________________________________________________________
 
Name:       RHODA ANDRADE                Room #:                     REG CLI 
M.R.#:      [id number]                       Account #:      [id number]  
Admission:  08/05/19    Attend Phys:    Al Adhikari MD    
Discharge:              Date of Birth:  [birth year]  
                                                          Report #: [id number]
                                                                    [id number]   
_______________________________________________________________________________
THIS REPORT FOR:   //name//                          
 
CC: Al Sheehan
 
DATE OF SERVICE:  08/05/2019
 
 
FOLLOWUP CONSULTATION IN THE OUTPATIENT CLINIC.
 
HISTORY OF PRESENT ILLNESS:  The patient returns today in followup of her
suspected right chest abscess and right posterior neck abscess, diagnosed
06/26/2019.  Cultures of the wound grew methicillin susceptible Enterococcus. 
She remains on Unasyn via a left upper extremity PICC.  Followup CT scan showed
no improvement in the right chest lesion.  She then was seen by Pulmonary
Medicine this past week, who is scheduling her for a bronchoscopy.  The
posterior neck wound continues to heal.  Diabetes is under good control.  She
has not followed up with dentistry to evaluate her dental caries.
 
REVIEW OF SYSTEMS:  Her PICC has been functioning well.  She has had no fever,
chills or sweats.  There has been no chest pain.  No cough or sputum production.
 No hemoptysis.  No dyspnea on exertion.  No GI or  complaints.
 
PHYSICAL EXAMINATION:
VITAL SIGNS:  Afebrile and hemodynamically stable.
EXTREMITIES:  Left upper extremity PICC site without drainage.  Posterior neck
wound had good granulation tissue with no surrounding erythema.
CHEST:  Clear.
HEART:  Regular.
ABDOMEN:  Soft and nontender.  Mouth with dental caries unchanged.
 
LABORATORY STUDIES:  Echocardiogram showed no valvular abnormalities. 
Hemoglobin is 9.3, white count 5.5, creatinine 0.6.  Liver function test is
normal.
 
IMPRESSION:
1.  Enterococcal soft tissue neck infection, status post debridement, now day
#40.  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  Has not
improved after one month of IV therapy.  She is to undergo bronchoscopy in the
near future.
2.  Diabetes.
3.  Obesity.
4.  Dental caries.
 
PLAN:
1.  Continue IV antibiotic therapy.  The patient does need to get in to see her
 
 
 
[facility]
[address]
[place]   [address]                     CONSULTATION                  
_______________________________________________________________________________
 
Name:       [facility]                Room #:                     REG DAVID CURRAN#:      [id number]                       Account #:      [id number]  
Admission:  08/05/19    Attend Phys:    Al Adhikari MD    
Discharge:              Date of Birth:  [birth year]  
                                                          Report #: [id number]
                                                                    [id number]   
_______________________________________________________________________________
dentist while she is on her antibiotics.  This was reinforced today.  We will
await findings from her bronchoscopy.  Continue diabetic control.
2.  Continue weight loss diet.
3.  Follow up in 1 week with Dr. Le to continue her treatment plan.
 
 
 
 
 
 
 
 
 
 
 
 
 
 
 
 
 
 
 
 
 
 
 
 
 
 
 
 
 
 
 
 
 
 
 
 
 
 
 
 
  <ELECTRONICALLY SIGNED>
   By: Al Adhikari MD            
  08/12/19     1154
D: 08/05/19 1315                           _____________________________________
T: 08/05/19 2134                           Al Adhikari MD              /nt

## 2019-08-12 NOTE — HC
[facility]
[address]
[place]   [address]                     CONSULTATION                  
_______________________________________________________________________________
 
Name:       RHODA ANDRADE                Room #:                     REG CLI 
M.R.#:      [id number]                       Account #:      [id number]  
Admission:  08/12/19    Attend Phys:    Bull Castillo MD  
Discharge:              Date of Birth:  [birth year]  
                                                          Report #: [id number]
                                                                    [id number]   
_______________________________________________________________________________
THIS REPORT FOR:   //name//                          
 
CC: NING Castillo
 
DATE OF SERVICE:  08/12/2019
 
 
CHIEF COMPLAINT:  Followup enterococcal neck abscess and right chest mass.
 
HISTORY OF PRESENT ILLNESS:  The patient returns now 6 weeks complete antibiotic
therapy with Unasyn for extensive right posterior neck abscess due to penicillin
susceptible Enterococcus.  Her left upper extremity PICC has been functioning
well.  No fever, chills or sweats.  No cough or sputum production.  Pain in her
neck has resolved.  Minimal drainage.  Very small wound now.  Diabetes, under
good control.  No dyspnea.  No nausea, vomiting or diarrhea.  Echocardiogram was
unremarkable.  Bronchoscopy done this past week, was unremarkable.  Cultures are
negative so far including virus and aerobic and anaerobic bacteria, AFB and
fungus.  Review of the bronchoscopy note showed orifice of the superior segment
in the right lower lobe was swollen with no other lesion identified.  There was
some mucous plugging.  Cytology and micro brush obtained and results are
currently pending.  She is to see Dentistry this week.
 
REVIEW OF SYSTEMS:  Ten-point review was negative other than what is described
above.
 
PHYSICAL EXAMINATION:
VITAL SIGNS:  Afebrile and hemodynamically stable.
EXTREMITIES:  Left upper extremity PICC without drainage or erythema.  Right
posterior neck wound was very small with clean granulation tissue.  No
surrounding cellulitis.
CHEST:  Clear.
HEART:  Regular, without murmur.
ABDOMEN:  Soft and nontender.
OROPHARYNX:  No change in her dental caries.
 
LABORATORY STUDIES:  Cultures as noted above.  WBC 5.9.  Hemoglobin 9.7,
creatinine 0.6, potassium 3.8.
 
IMPRESSION:
1.  Enterococcal soft tissue neck infection, status post debridement, now day
#47.
2.  Right lung cavitary mass, status post bronchoscopy, awaiting cultures and
 
 
 
[facility]
[address] [place]   [address]                     CONSULTATION                  
_______________________________________________________________________________
 
Name:       [facility]                Room #:                     REG [facility].#:      [id number]                       Account #:      [id number]  
Admission:  08/12/19    Attend Phys:    Bull Castillo MD  
Discharge:              Date of Birth:  [birth year]  
                                                          Report #: [id number]
                                                                    [id number]   
_______________________________________________________________________________
cytology.
3.  Diabetes.
4.  Obesity.
5.  Dental caries.
 
RECOMMENDATION:  We will finish her course of IV antibiotic therapy today.  We
will change to Augmentin 875 mg b.i.d. for the next 2 weeks while she continues
with her dental evaluation along with awaiting for her culture results from
bronchoscopy.  We will see her back in 10-14 days to determine her total length
of therapy.
 
 
 
 
 
 
 
 
 
 
 
 
 
 
 
 
 
 
 
 
 
 
 
 
 
 
 
 
 
 
 
 
 
 
                         
   By:                               
                   
D: 08/12/19 1202                           _____________________________________
T: 08/12/19 2208                           Al Adhikari MD              /nt

## 2019-08-26 ENCOUNTER — HOSPITAL ENCOUNTER (OUTPATIENT)
Dept: HOSPITAL 35 - HYPER | Age: 44
End: 2019-08-26
Attending: PREVENTIVE MEDICINE
Payer: COMMERCIAL

## 2019-08-26 VITALS — DIASTOLIC BLOOD PRESSURE: 78 MMHG | SYSTOLIC BLOOD PRESSURE: 132 MMHG

## 2019-08-26 DIAGNOSIS — L98.491: ICD-10-CM

## 2019-08-26 DIAGNOSIS — E11.622: ICD-10-CM

## 2019-08-26 DIAGNOSIS — Z87.891: ICD-10-CM
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
## 2019-08-26 DIAGNOSIS — Z79.84: ICD-10-CM

## 2019-08-26 DIAGNOSIS — Z86.711: ICD-10-CM

## 2019-08-26 DIAGNOSIS — T81.49XD: Primary | ICD-10-CM

## 2019-08-26 DIAGNOSIS — L02.11: ICD-10-CM

## 2019-08-26 DIAGNOSIS — J45.909: ICD-10-CM

## 2019-08-26 DIAGNOSIS — E78.5: ICD-10-CM

## 2019-08-26 DIAGNOSIS — Z86.718: ICD-10-CM

## 2019-08-26 LAB
ALBUMIN SERPL-MCNC: 2.9 G/DL (ref 3.4–5)
ALT SERPL-CCNC: 41 U/L (ref 30–65)
ANION GAP SERPL CALC-SCNC: 9 MMOL/L (ref 7–16)
AST SERPL-CCNC: 24 U/L (ref 15–37)
BILIRUB SERPL-MCNC: 0.3 MG/DL
BUN SERPL-MCNC: 8 MG/DL (ref 7–18)
CALCIUM SERPL-MCNC: 8.5 MG/DL (ref 8.5–10.1)
CHLORIDE SERPL-SCNC: 101 MMOL/L (ref 98–107)
CO2 SERPL-SCNC: 25 MMOL/L (ref 21–32)
CREAT SERPL-MCNC: 0.8 MG/DL (ref 0.6–1)
ERYTHROCYTE [DISTWIDTH] IN BLOOD BY AUTOMATED COUNT: 20.2 % (ref 10.5–14.5)
GLUCOSE SERPL-MCNC: 307 MG/DL (ref 74–106)
HCT VFR BLD CALC: 33.2 % (ref 37–47)
HGB BLD-MCNC: 10.1 GM/DL (ref 12–15)
MCH RBC QN AUTO: 21.6 PG (ref 26–34)
MCHC RBC AUTO-ENTMCNC: 30.4 G/DL (ref 28–37)
MCV RBC: 71.1 FL (ref 80–100)
PLATELET # BLD: 286 THOU/UL (ref 150–400)
POTASSIUM SERPL-SCNC: 3.6 MMOL/L (ref 3.5–5.1)
PROT SERPL-MCNC: 7.6 G/DL (ref 6.4–8.2)
RBC # BLD AUTO: 4.67 MIL/UL (ref 4.2–5)
SODIUM SERPL-SCNC: 135 MMOL/L (ref 136–145)
WBC # BLD AUTO: 6.6 THOU/UL (ref 4–11)

## 2019-08-26 PROCEDURE — 91016: CPT

## 2019-08-26 NOTE — HC
[facility]
[address]
[place]   [address]                     CONSULTATION                  
_______________________________________________________________________________
 
Name:       RHODA ANDRADE                Room #:                     REG CLI 
[facility].#:      [id number]                       Account #:      [id number]  
Admission:  08/26/19    Attend Phys:    Bull Castillo MD  
Discharge:              Date of Birth:  [birth year]  
                                                          Report #: [id number]
                                                                    [id number]   
_______________________________________________________________________________
THIS REPORT FOR:   //name//                          
 
CC: Al Castillo
 
DATE OF SERVICE:  08/26/2019
 
 
HISTORY OF PRESENT ILLNESS:  Follow up enterococcal neck abscess and right chest
mass.  The patient returns now 8 weeks following completion of her right neck
abscess due to penicillin susceptible Enterococcus.  She completed her course of
Augmentin.  Dental evaluation found no dental caries or evidence of apical
abscesses.  She underwent dental cleaning and was scheduled to follow up in
several months.  She has had no fever, chills or sweats.  No cough or sputum
production.  Overall, she feels well.  She underwent bronchoscopy.  Cultures
revealed 4+ pseudomonas from the right lower lobe region.  This was the area
that was abnormal on bronchoscopy visualization.  The other culture was no
growth.  She has had no pleuritic chest pain.  No sputum production.
 
REVIEW OF SYSTEMS:  Ten-point review was negative other than what is described
above.
 
PHYSICAL EXAMINATION:
VITAL SIGNS:  Afebrile and hemodynamically stable.
GENERAL:  She is alert and cooperative.  She was ambulatory.  Neck wound was
very small clean, no surrounding erythema.
CHEST:  Clear.
HEART:  Regular, without murmur.
ABDOMEN:  Soft.
EXTREMITIES:  Without edema.
MOUTH:  With improvement in her gums and teeth.
 
LABORATORY STUDIES:  Pending.
 
IMPRESSION:
1.  The patient has completed her course of therapy for enterococcal soft tissue
infection of her neck.
2.  Right lung cavitary mass, now with Pseudomonas aeruginosa growth high
numbers from culture in the right lower lobe.  Despite being asymptomatic, I am
concerned that this may be the etiology of her chest infection.  It was noted
that when she came in initially that she was having minimal respiratory
complaints.
3.  Diabetes.
 
 
 
[facility]
[address] [place]   [address]                     CONSULTATION                  
_______________________________________________________________________________
 
Name:       [facility]                Room #:                     REG CLI 
M.R.#:      [id number]                       Account #:      [id number]  
Admission:  08/26/19    Attend Phys:    Bull Castillo MD  
Discharge:              Date of Birth:  [birth year]  
                                                          Report #: [id number]
                                                                    [id number]   
_______________________________________________________________________________
4.  Obesity.
 
RECOMMENDATIONS:  We will continue antibiotic coverage with ciprofloxacin.  I
discussed potential side effects.  She has a 2-week supply of 750 mg b.i.d.  She
has one refill with this.  We will get an x-ray again today and follow her over
time.  If this right lower lobe lesion does not improve, we will need further
sampling.
 
 
 
 
 
 
 
 
 
 
 
 
 
 
 
 
 
 
 
 
 
 
 
 
 
 
 
 
 
 
 
 
 
 
 
 
 
                         
   By:                               
                   
D: 08/26/19 1642                           _____________________________________
T: 08/26/19 0230                           Al Adhikari MD              /nt

## 2019-08-26 NOTE — NUR
IN FOR CLINIC VISIT WITH DR. LONGORIA. LABS DRAWN PER . DR. LONGORIA VISITED.
PATIENT CAME TO US FROM WOUND CLINIC. BANDAID INTACT TO NECK. NEW ORDERS
WRITTEN. PRESCRIPTION GIVEN FOR CIPRO. PLAN IS TO RETURN TO CLINIC IN 2 WEEKS
FOR F/U VISIT AND LAB. CXR DONE. DISMISSED IN STABLE CONDITION.

## 2019-09-09 ENCOUNTER — HOSPITAL ENCOUNTER (OUTPATIENT)
Dept: HOSPITAL 35 - OPONC | Age: 44
End: 2019-09-09
Attending: SPECIALIST
Payer: COMMERCIAL

## 2019-09-09 DIAGNOSIS — R91.8: Primary | ICD-10-CM

## 2019-09-09 DIAGNOSIS — K52.1: ICD-10-CM

## 2019-09-09 DIAGNOSIS — L02.11: ICD-10-CM

## 2019-09-09 DIAGNOSIS — B95.2: ICD-10-CM

## 2019-09-09 DIAGNOSIS — E11.9: ICD-10-CM

## 2019-09-09 DIAGNOSIS — B96.5: ICD-10-CM

## 2019-09-09 DIAGNOSIS — E66.9: ICD-10-CM

## 2019-09-09 LAB
ANION GAP SERPL CALC-SCNC: 7 MMOL/L (ref 7–16)
BUN SERPL-MCNC: 8 MG/DL (ref 7–18)
CALCIUM SERPL-MCNC: 9.2 MG/DL (ref 8.5–10.1)
CHLORIDE SERPL-SCNC: 101 MMOL/L (ref 98–107)
CO2 SERPL-SCNC: 29 MMOL/L (ref 21–32)
CREAT SERPL-MCNC: 0.7 MG/DL (ref 0.6–1)
ERYTHROCYTE [DISTWIDTH] IN BLOOD BY AUTOMATED COUNT: 19.5 % (ref 10.5–14.5)
GLUCOSE SERPL-MCNC: 226 MG/DL (ref 74–106)
HCT VFR BLD CALC: 33.3 % (ref 37–47)
HGB BLD-MCNC: 10.1 GM/DL (ref 12–15)
MCH RBC QN AUTO: 21.4 PG (ref 26–34)
MCHC RBC AUTO-ENTMCNC: 30.3 G/DL (ref 28–37)
MCV RBC: 70.8 FL (ref 80–100)
PLATELET # BLD: 323 THOU/UL (ref 150–400)
POTASSIUM SERPL-SCNC: 3.7 MMOL/L (ref 3.5–5.1)
RBC # BLD AUTO: 4.71 MIL/UL (ref 4.2–5)
SODIUM SERPL-SCNC: 137 MMOL/L (ref 136–145)
WBC # BLD AUTO: 5.8 THOU/UL (ref 4–11)

## 2019-09-09 PROCEDURE — 91016: CPT

## 2019-09-09 NOTE — NUR
IN FOR CLINIC VISIT WITH DR. LONGORIA. LABS DRAWN PER . PATIENT STATED HAS
BEEN TAKING CIPRO ONLY ONCE A DAY AS IT IS CAUSING DIARRHEA. PRESCRIPTION WAS
FOR CIPRO BID. DR. LONGORIA NOTIFIED. NEW ORDERS WRITTEN. NECK INCISION HEALED.
CXR DONE FOR RLL LESION PER ORDERS. TO RETURN IN 2 WEEKS FOR NEXT CLINIC VISIT
AND LAB. DR. LONGORIA ENCOURAGED PATIENT TO TAKE CIPRO BID AND TREAT DIARRHEA WITH
IMMODIUM AND A PROBIOTIC. DISMISSED IN GOOD CONDITION.

## 2019-09-23 ENCOUNTER — HOSPITAL ENCOUNTER (OUTPATIENT)
Dept: HOSPITAL 35 - OPONC | Age: 44
End: 2019-09-23
Attending: SPECIALIST
Payer: COMMERCIAL

## 2019-09-23 VITALS — SYSTOLIC BLOOD PRESSURE: 136 MMHG | DIASTOLIC BLOOD PRESSURE: 78 MMHG

## 2019-09-23 DIAGNOSIS — E66.9: ICD-10-CM

## 2019-09-23 DIAGNOSIS — R22.2: Primary | ICD-10-CM

## 2019-09-23 DIAGNOSIS — E11.9: ICD-10-CM

## 2019-09-23 LAB
ERYTHROCYTE [DISTWIDTH] IN BLOOD BY AUTOMATED COUNT: 20 % (ref 10.5–14.5)
HCT VFR BLD CALC: 34.7 % (ref 37–47)
HGB BLD-MCNC: 10.3 GM/DL (ref 12–15)
MCH RBC QN AUTO: 20.7 PG (ref 26–34)
MCHC RBC AUTO-ENTMCNC: 29.7 G/DL (ref 28–37)
MCV RBC: 69.7 FL (ref 80–100)
PLATELET # BLD: 275 THOU/UL (ref 150–400)
RBC # BLD AUTO: 4.97 MIL/UL (ref 4.2–5)
WBC # BLD AUTO: 7 THOU/UL (ref 4–11)

## 2019-09-23 PROCEDURE — 91018: CPT

## 2019-09-23 NOTE — HC
[facility]
[address]
[place]   [address]                     CONSULTATION                  
_______________________________________________________________________________
 
Name:       RHODA ANDRADE                Room #:                     REG CLI 
M.R.#:      [id number]                       Account #:      [id number]  
Admission:  09/23/19    Attend Phys:    Al Adhikari MD    
Discharge:              Date of Birth:  [birth year]  
                                                          Report #: [id number]
                                                                    [id number]   
_______________________________________________________________________________
THIS REPORT FOR:   //name//                          
 
CC: Al Sheehan MD
 
OUTPATIENT FOLLOWUP INFECTIOUS DISEASE VISIT
 
HISTORY OF PRESENT ILLNESS:  The patient returns in followup of her right chest
mass.  Recent bronchoscopy was positive for pansensitive Pseudomonas aeruginosa.
 Although, trial of ciprofloxacin was started.  The patient did not tolerate
this and discontinued her therapy due to GI upset.  She was seen again 2 weeks
ago without any infection related symptoms.  It was therefore recommended that
we withhold any further treatment and observe for another several weeks.  She
comes back in now.  No fever, chills or sweats.  No cough or sputum production. 
No chest pain.  No weight loss.  Still has a little bit of soft tissue stiffness
in her right neck where she had her abscess debrided.  The tissues have closed. 
She has had no other GI or  complaints.  She was afebrile and hemodynamically
stable.  Moderately obese.  No acute distress.
EYES:  Without scleral icterus.
MOUTH:  Without mucositis.
NECK:  Incisional wound was well healed with scarring.  There was no fluctuance.
 There was no tenderness.  There was no erythema.  She had full range of motion
in her neck.
LUNGS:  Clear.
HEART:  Regular, without murmur.
ABDOMEN:  Obese, soft, nontender, no hepatosplenomegaly or mass.
 
LABORATORY STUDIES:  Hemoglobin is 10.3, WBC 7, platelet count 275,000. 
Sedimentation rate is pending.  Chest x-ray shows no change in the right
perihilar mass from previous imaging studies 6 weeks ago.  Her last CT scan of
the chest was on 07/15/2019, which revealed a bilobed cavitary nodular mass in
the superior segment of the right lower lobe posterior to the right inferior
hilum.  This was unchanged from the previous scan of 06/26/2019.
 
The patient has a right lung cavitary mass without any change over the last 3
months.  This is not acting like a pseudomonas infection.  The previous
bronchoscopy performed on 08/08/2019 has revealed negative fungus culture and so
far negative mycobacterial culture.
 
I have discussed the case again with Dr. Olivia, who will plan on doing a
landmark type of CT scan imaging along with PET scanning followed by targeted
bronchoscopy procedure for repeat biopsy and for pathology and culture.  We
discussed this with the patient who is in agreement.  She will contact me if any
change in condition in the meantime.  I will then see her back after this
 
 
 
[address]   [address]                     CONSULTATION                  
_______________________________________________________________________________
 
[facility]                Room #:                     REG CLI 
M.R.#:      [id number]                       Account #:      [id number]  
Admission:  09/23/19    Attend Phys:    Al Adhikari MD    
Discharge:              Date of Birth:  [birth year]  
                                                          Report #: [id number]
                                                                    [id number]   
_______________________________________________________________________________
procedure.
 
IMPRESSION:
1.  Enterococcal right neck abscess, resolved.
2.  Diabetes.
3.  Obesity.
 
PLAN:
1.  Arrange CT scan of the chest with PET scan in addition.
2.  Pulmonary service to arrange targeted bronchoscopy for repeat biopsy and
cultures for AFB, fungus, microbacteria along with pathology.
3.  The patient to follow up after bronchoscopy.
 
 
 
 
 
 
 
 
 
 
 
 
 
 
 
 
 
 
 
 
 
 
 
 
 
 
 
 
 
 
 
 
                         
   By:                               
                   
D: 09/23/19 1406                           _____________________________________
T: 09/24/19 0017                           Al Adhikari MD              /nt

## 2019-09-23 NOTE — NUR
IN FOR CLINIC VISIT WITH DR. DEVIN LONGORIA AND LABS. LABS DRAWN PER . CXR
DONE. PATIENT DENIED PAIN, N/V, FEVER/CHILLS, DIARRHEA. DR. LONGORIA VISITED.
DR. LONGORIA CALLED AND SPOKE WITH DR. PEREZ REGARDING LUNG MASS. NEW ORDERS
WRITTEN TO CONSULT DR. PEREZ. CALLED OFFICE AND SPOKE WITH ANDRIY, WHOM STATED
DR. MOJICA IS AWARE AND WILL ADDRESS NEW ORDER TOMORROW. PLAN IS FOR PATIENT TO
HAVE A CT/PET SCANS OF LUNGS, AND A BRONCHCOSCOPY PER DR. PEREZ AND THEN CALL
US WHEN THESE ARE COMPLETED FOR FOLLOWUP VISIT WITH DR. LONGORIA. DISMISSED IN
GOOD CONDITION.

## 2019-11-04 ENCOUNTER — HOSPITAL ENCOUNTER (OUTPATIENT)
Dept: HOSPITAL 35 - CAT | Age: 44
End: 2019-11-04
Attending: INTERNAL MEDICINE
Payer: COMMERCIAL

## 2019-11-04 DIAGNOSIS — J98.4: Primary | ICD-10-CM

## 2020-07-08 ENCOUNTER — HOSPITAL ENCOUNTER (OUTPATIENT)
Dept: HOSPITAL 35 - PET | Age: 45
End: 2020-07-08
Attending: INTERNAL MEDICINE
Payer: COMMERCIAL

## 2020-07-08 DIAGNOSIS — J98.4: Primary | ICD-10-CM

## 2020-08-18 ENCOUNTER — HOSPITAL ENCOUNTER (OUTPATIENT)
Dept: HOSPITAL 35 - CAT | Age: 45
End: 2020-08-18
Attending: INTERNAL MEDICINE
Payer: COMMERCIAL

## 2020-08-18 DIAGNOSIS — J98.4: ICD-10-CM

## 2020-08-18 DIAGNOSIS — I25.10: ICD-10-CM

## 2020-08-18 DIAGNOSIS — I51.7: ICD-10-CM

## 2020-08-18 DIAGNOSIS — I31.3: ICD-10-CM

## 2020-08-18 DIAGNOSIS — I70.0: ICD-10-CM

## 2020-08-18 DIAGNOSIS — J84.10: Primary | ICD-10-CM

## 2020-08-21 ENCOUNTER — HOSPITAL ENCOUNTER (OUTPATIENT)
Dept: HOSPITAL 35 - LAB | Age: 45
End: 2020-08-21
Attending: INTERNAL MEDICINE
Payer: COMMERCIAL

## 2020-08-21 DIAGNOSIS — Z01.812: Primary | ICD-10-CM

## 2020-08-21 DIAGNOSIS — Z20.828: ICD-10-CM

## 2020-08-26 ENCOUNTER — HOSPITAL ENCOUNTER (OUTPATIENT)
Dept: HOSPITAL 35 - PUL | Age: 45
Discharge: HOME | End: 2020-08-26
Attending: INTERNAL MEDICINE
Payer: COMMERCIAL

## 2020-08-26 VITALS — WEIGHT: 258 LBS | BODY MASS INDEX: 47.48 KG/M2 | HEIGHT: 62 IN

## 2020-08-26 VITALS — SYSTOLIC BLOOD PRESSURE: 147 MMHG | DIASTOLIC BLOOD PRESSURE: 88 MMHG

## 2020-08-26 DIAGNOSIS — R91.8: Primary | ICD-10-CM

## 2020-08-26 DIAGNOSIS — E78.00: ICD-10-CM

## 2020-08-26 DIAGNOSIS — R04.2: ICD-10-CM

## 2020-08-26 DIAGNOSIS — Z87.891: ICD-10-CM

## 2020-08-26 DIAGNOSIS — J45.909: ICD-10-CM

## 2020-08-26 DIAGNOSIS — Z98.890: ICD-10-CM

## 2020-08-26 DIAGNOSIS — Z79.899: ICD-10-CM

## 2020-08-26 DIAGNOSIS — E11.9: ICD-10-CM

## 2020-08-26 DIAGNOSIS — D64.9: ICD-10-CM

## 2020-08-26 DIAGNOSIS — B96.89: ICD-10-CM

## 2020-08-26 DIAGNOSIS — I10: ICD-10-CM

## 2020-08-26 PROCEDURE — 70005: CPT

## 2020-08-26 PROCEDURE — 62110: CPT

## 2020-08-26 PROCEDURE — 62900: CPT

## 2020-08-26 NOTE — EKG
[facility]
[address]
[place]   [address]                     ELECTROCARDIOGRAM REPORT      
_______________________________________________________________________________
 
Name:       RHODA ANDRADE                Room #:                     REG CLI 
M.R.#:      [id number]                       Account #:      [id number]  
Admission:  20    Attend Phys:    Enoch Olivia MD   
Discharge:              Date of Birth:  [birth year]  
                                                          Report #: [id number]
                                                                    [id number]
_______________________________________________________________________________
THIS REPORT FOR:  
 
cc:  Raquel Sheehan MD, Nora P. MD Lundgren,Eder JUAREZ MD [facility]                                        ~
THIS REPORT FOR:   //name//                          
 
                          [facility]
                                       
Test Date:    2020               Test Time:    07:10:38
Pat Name:     RHODA ANDRADE           Department:   
Patient ID:   [id number]            Room:          
Gender:       F                        Technician:   TOSHIA
:          1975               Requested By: Enoch Olivia
Order Number: [id number] MD:   Eder Simmons
                                 Measurements
Intervals                              Axis          
Rate:         82                       P:            40
IA:           146                      QRS:          11
QRSD:         79                       T:            27
QT:           385                                    
QTc:          450                                    
                           Interpretive Statements
Sinus rhythm
Normal tracing
No previous ECG available for comparison
Electronically Signed On 2020 8:37:01 CDT by Eder Simmons
[email or web address]
 
 
 
 
 
 
 
 
 
 
 
 
 
 
 
 
  <ELECTRONICALLY SIGNED>
   By: Eder Simmons MD, [facility]   
  20     0837
D: 20 0710                           _____________________________________
T: 20                           Eder Simmons MD, [facility]     /EPI

## 2020-08-27 NOTE — PATH
[facility]
[address]
[place]   [address]                     PATHOLOGY RPT PROCEDURE       
_______________________________________________________________________________
 
[facility]                Room #:                     REG CLI 
M.R.#:      [id number]     Account #:      [id number]  
Admission:  08/26/20    Date of Birth:  [birth year]  
Discharge:                                              Report #:    [id number]
                                                        Path Case #: [id number]
_______________________________________________________________________________
Note
LCA Accession Number: [id number]
   TESTS               RESULT  FLAG  UNITS    REF RANGE  LAB
------------------------------------------------------------
   Clinician Provided Cytology Information
   No. of containers..01 Slide
Source:                                                   01
   BRONCH BRUSHING RLL
DIAGNOSIS:                                                02
   BRONCH BRUSHING RLL
   NEGATIVE FOR MALIGNANT EPITHELIAL CELLS.
   REACTIVE BRONCHIAL CELLS ARE PRESENT.
   ABUNDANT RED BLOOD CELLS ARE PRESENT.
   PULMONARY MACROPHAGES (DUST CELLS) ARE PRESENT.
Pathologist ICD10:                                        02
   R91.8
Signed out by:                                            02
   Bisi Hutchinson MD, Pathologist
   NPI- [id number]
Performed by:                                             01
   Elke Becker, Cytotechnologist ([facility])
Gross description:                                        01
   4 FIXED
   /LCS  08/26/2020  1707 Local
 
------------------------------------------------------------
    FLAG LEGEND:
    L-Low Normal,H-High Normal,LL-Alert Low,HH-Alert High
    <-Panic Low,>-Panic High,A-Abnormal,AA-Critical Abnormal
------------------------------------------------------------
 
Performed at:
[address] [facility] [address]
   [place]  [address]
   Martin Bojorquez MD, Phone: [phone]
[address]  [address]
   Bisi Hutchinson MD, Phone: [phone]
Specimen Comment: A courtesy copy of this report has been sent to [phone]
[phone]
Specimen Comment: [phone]
Specimen Comment: Report sent to  / DR GUTIERREZ
Specimen Comment: A duplicate report has been generated due to demographic
updates.
***Performed at:  01
   [address], [place], [place]  [id number]
 
 
[address]   [address]                     PATHOLOGY RPT PROCEDURE       
_______________________________________________________________________________
 
[facility]                [facility] #:                     REG CLI 
[facility]ANGEL[facility].#:      [id number]     Account #:      [id number]  
Admission:  08/26/20    Date of Birth:  [birth year]  
Discharge:                                              Report #:    [id number]
                                                        Path Case #: [id number]
_______________________________________________________________________________
   MD Martin Bojorquez MD Phone:  [phone]

## 2020-08-27 NOTE — PATH
[facility]
[address]
[place]   [address]                     PATHOLOGY RPT PROCEDURE       
_______________________________________________________________________________
 
Name:       LUPERHODA                Room #:                     REG [facility].#:      [id number]     Account #:      [id number]  
Admission:  08/26/20    Date of Birth:  [birth year]  
Discharge:                                              Report #:    [id number]
                                                        Path Case #: [id number]
_______________________________________________________________________________
 
LCA Accession Number: [id number]
.                                                                01
Material submitted:                                        .
lung - TBNA GENCUT RLL BIOPSY. Modifiers: right
.                                                                02
**********************************************************************
Diagnosis:
Bronchus, right lower lobe, transbronchial needle biopsy/aspiration:
- Squamous metaplastic epithelial fragment with increased mitotic figures
as well as atypia; favor reactive.
- Strips of benign bronchial mucosal fragments showing no evidence of
dysplasia or malignancy.
- Abundant blood and fibrin present within the background.
.
(IUV:mml; 08/27/2020)
QL  08/27/2020  1145 Local
**********************************************************************
.                                                                02
Comment:
One unoriented fragment of metaplastic squamous epithelium with mitotic
figures is noted.  There is no evidence of nuclear pleomorphism
identified.  The fragment appears two-dimensional (flat) and is noted in
association with inflammatory cells.  Therefore, findings are favored to
be reactive.  Much of the sample (90%)is comprised of blood, as well as
benign bronchial mucosal fragments with ciliated columnar epithelium, mild
chronic inflammation within the underlying lamina propria as well as its
muscular layer.  There is no evidence of malignancy within the examined
tissue.  The concurrent cytology, -L49-0007-0, -N71-0008-0,
and -X75-0009-0 show no malignant epithelial cells as well.  Please
refer to separate reports for complete details.
.
(IUV:mml; 08/27/2020)
.                                                                02
Electronically signed:                                     .
Bisi Hutchinson MD, Pathologist
NPI- [id number]
.                                                                01
Gross description:                                         .
The specimen is received in formalin, labeled "Rhoda Cote", "TBNA
GenCut RLL biopsy".  Received are multiple needle core and needle core
fragments of hemorrhagic appearing dark red-tan soft tissue, measuring 1.0
x 1.0 x 0.2 cm in aggregate dimensions.  The specimen is filtered and
entirely submitted in cassettes A1 to A3.([facility]; 8/26/2020)
HUBER/JEFF  08/27/2020  1130 Local
.                                                                02
Pathologist provided ICD-10:
R91.8
 
 
[address]   [address]                     PATHOLOGY RPT PROCEDURE       
_______________________________________________________________________________
 
Name:       RHODA COTE                Room #:                     REG CLI 
M.R.#:      [id number]     Account #:      [id number]  
Admission:  08/26/20    Date of Birth:  [birth year]  
Discharge:                                              Report #:    [id number]
                                                        Path Case #: [id number]
_______________________________________________________________________________
.                                                                02
CPT                                                        .
579423
Specimen Comment: A courtesy copy of this report has been sent to [phone]
[phone]
Specimen Comment: [phone]
Specimen Comment: Report sent to  / DR GUTIERREZ
***Performed at:  01
   [address], [place]  [id number]
   MD Martin Bojorquez MD Phone:  [phone]
***Performed at:  02
   [address]  [id number]
   MD Bisi Hutchinson MD Phone:  [phone]

## 2020-08-27 NOTE — PATH
[facility]
[address]
[place]   [address]                     PATHOLOGY RPT PROCEDURE       
_______________________________________________________________________________
 
[facility]       [facility]                Room #:                     REG DAVID HONEYCUTT.#:      [id number]     Account #:      [id number]  
Admission:  08/26/20    Date of Birth:  [birth year]  
Discharge:                                              Report #:    [id number]
                                                        Path Case #: [id number]
_______________________________________________________________________________
Note
[facility] Accession Number: [id number]
   TESTS               RESULT  FLAG  UNITS    REF RANGE  LAB
------------------------------------------------------------
   Clinician Provided Cytology Information
   No. of containers..01 Other (Miscellaneous)
Source:                                                   01
   BRONC BRUSH TIP RLL
DIAGNOSIS:                                                02
   BRONC BRUSH TIP RLL
   NEGATIVE FOR MALIGNANT EPITHELIAL CELLS.
   REACTIVE BRONCHIAL CELLS ARE PRESENT.
   ABUNDANT RED BLOOD CELLS ARE PRESENT.
   PULMONARY MACROPHAGES (DUST CELLS) ARE PRESENT.
Pathologist ICD10:                                        02
   R91.8
Signed out by:                                            02
   Bisi Hutchinson MD, Pathologist
   NPI- [id number]
Performed by:                                             Luz Elena Becker, Cytotechnologist ([facility])
Gross description:                                        01
   1 REILLY
   /VALENTINO  08/26/2020  1709 Local
 
------------------------------------------------------------
    FLAG LEGEND:
    L-Low Normal,H-High Normal,LL-Alert Low,HH-Alert High
    <-Panic Low,>-Panic High,A-Abnormal,AA-Critical Abnormal
------------------------------------------------------------
 
Performed at:
[address]
   [place]  [address]
   Martin Bojorquez MD, Phone: [phone]
[address]  [address]
   Bisi Hutchinson MD, Phone: [phone]
Specimen Comment: A courtesy copy of this report has been sent to [phone]
[phone]
Specimen Comment: [phone]
Specimen Comment: Report sent to  / DR GUTIERREZ
Specimen Comment: A duplicate report has been generated due to demographic
updates.
***Performed at:  01
   [address], [place], [place]  [id number]
 
 
[address]   [address]                     PATHOLOGY RPT PROCEDURE       
_______________________________________________________________________________
 
Name:       LUPE,[facility]                Room #:                     REG DAVID 
BINA#:      [id number]     Account #:      [id number]  
Admission:  08/26/20    Date of Birth:  [birth year]  
Discharge:                                              Report #:    [id number]
                                                        Path Case #: [id number]
_______________________________________________________________________________
   MD Martin Bojorquez MD Phone:  [phone]

## 2020-08-28 NOTE — PATH
[facility]
[address] [place]   [address]                     PATHOLOGY RPT PROCEDURE       
_______________________________________________________________________________
 
[facility]       [facility]                Room #:                     REG DAVID HONEYCUTT.#:      [id number]     Account #:      [id number]  
Admission:  08/26/20    Date of Birth:  [birth year]  
Discharge:                                              Report #:    [id number]
                                                        Path Case #: [id number]
_______________________________________________________________________________
Note
[facility] Accession Number: [id number]
   TESTS               RESULT  FLAG  UNITS    REF RANGE  LAB
------------------------------------------------------------
   Clinician Provided Cytology Information
   No. of containers..01 Other (Miscellaneous)
Source:                                                   01
   BRONCH WASH RLL
DIAGNOSIS:                                                02
   BRONCH WASH RLL
   NEGATIVE FOR MALIGNANT EPITHELIAL CELLS.
   REACTIVE BRONCHIAL CELLS ARE PRESENT.
   PULMONARY MACROPHAGES (DUST CELLS) ARE PRESENT.
   REACTIVE SQUAMOUS CELLS ARE PRESENT.
Pathologist ICD10:                                        02
   R91.8
Signed out by:                                            02
   Bisi Hutchinson MD, Pathologist
   NPI- [id number]
Performed by:                                             01
   Elke Becker, Cytotechnologist ([facility])
Gross description:                                        01
   10ML, RED, 1 TP
   /LCS  08/26/2020  1705 Local
 
------------------------------------------------------------
    FLAG LEGEND:
    L-Low Normal,H-High Normal,LL-Alert Low,HH-Alert High
    <-Panic Low,>-Panic High,A-Abnormal,AA-Critical Abnormal
------------------------------------------------------------
 
Performed at:
[address]
   [place]  [address]
   Martin Bojorquez MD, Phone: [phone]
[address]  [address]
   Bisi Hutchinson MD, Phone: [phone]
Specimen Comment: A courtesy copy of this report has been sent to [phone]
[phone]
Specimen Comment: [phone]
Specimen Comment: Report sent to  / DR GUTIERREZ
Specimen Comment: A duplicate report has been generated due to demographic
updates.
***Performed at:  01
   [address], [place], [place]  [id number]
 
 
[address]   [address]                     PATHOLOGY RPT PROCEDURE       
_______________________________________________________________________________
 
Name:       LUPE,[facility]                Room #:                     REG DAVID 
BINA#:      [id number]     Account #:      [id number]  
Admission:  08/26/20    Date of Birth:  [birth year]  
Discharge:                                              Report #:    [id number]
                                                        Path Case #: [id number]
_______________________________________________________________________________
   MD Martin Bojorquez MD Phone:  [phone]

## 2021-03-22 ENCOUNTER — HOSPITAL ENCOUNTER (OUTPATIENT)
Dept: HOSPITAL 35 - CAT | Age: 46
End: 2021-03-22
Attending: INTERNAL MEDICINE
Payer: COMMERCIAL

## 2021-03-22 DIAGNOSIS — M79.89: ICD-10-CM

## 2021-03-22 DIAGNOSIS — J98.4: ICD-10-CM

## 2021-03-22 DIAGNOSIS — J84.10: Primary | ICD-10-CM

## 2021-03-22 DIAGNOSIS — R91.1: ICD-10-CM
